# Patient Record
Sex: MALE | Race: WHITE | Employment: PART TIME | URBAN - METROPOLITAN AREA
[De-identification: names, ages, dates, MRNs, and addresses within clinical notes are randomized per-mention and may not be internally consistent; named-entity substitution may affect disease eponyms.]

---

## 2017-01-10 ENCOUNTER — OFFICE VISIT (OUTPATIENT)
Dept: DERMATOLOGY | Facility: CLINIC | Age: 72
End: 2017-01-10
Payer: COMMERCIAL

## 2017-01-10 DIAGNOSIS — L30.1 DYSHIDROTIC ECZEMA: Primary | ICD-10-CM

## 2017-01-10 PROCEDURE — 96910 PHOTCHMTX TAR&UVB/PTRLTM&UVB: CPT | Performed by: PHYSICIAN ASSISTANT

## 2017-01-10 NOTE — PROGRESS NOTES
Pt here today for NBUVB for dyshidrotic eczema - doing great. Going to do once per week for this week and next  -Treatment # 71 - 9630  mj  - 1min 50 seconds   -Zinc oxide applied to lips, nipples   -Mineral oil to affected areas

## 2017-01-17 ENCOUNTER — OFFICE VISIT (OUTPATIENT)
Dept: DERMATOLOGY | Facility: CLINIC | Age: 72
End: 2017-01-17
Payer: COMMERCIAL

## 2017-01-17 DIAGNOSIS — L30.1 DYSHIDROTIC ECZEMA: Primary | ICD-10-CM

## 2017-01-17 PROCEDURE — 96910 PHOTCHMTX TAR&UVB/PTRLTM&UVB: CPT | Performed by: PHYSICIAN ASSISTANT

## 2017-01-19 NOTE — PROGRESS NOTES
Pt here today for NBUVB for dyshidrotic eczema - doing great - will stop after today's treatment  -Treatment # 16 - 2191  mj  - 1min 50 seconds   -Zinc oxide applied to lips, nipples   -Mineral oil to affected areas

## 2017-03-02 DIAGNOSIS — I10 ESSENTIAL HYPERTENSION, BENIGN: ICD-10-CM

## 2017-03-02 RX ORDER — LISINOPRIL AND HYDROCHLOROTHIAZIDE 20; 25 MG/1; MG/1
1 TABLET ORAL DAILY
Qty: 30 TABLET | Refills: 0 | Status: SHIPPED | OUTPATIENT
Start: 2017-03-02 | End: 2017-03-16

## 2017-03-02 NOTE — TELEPHONE ENCOUNTER
Lisinopril/HCTZ 20-25mg      Last Written Prescription Date: 2/25/2016  Last Fill Quantity: 90, # refills: 3  Last Office Visit with [facility] or [facility] prescribing provider: 2/25/2016  Next 5 appointments (look out 90 days)     Mar 31, 2017 10:15 AM CDT   Return Visit with Mani Rodríguez MD   [facility] ([facility])    [address] [place] [address] [id number] [phone]                   Potassium   Date Value Ref Range Status   02/18/2016 4.0 3.4 - 5.3 mmol/L Final     Creatinine   Date Value Ref Range Status   02/18/2016 1.01 0.66 - 1.25 mg/dL Final     BP Readings from Last 3 Encounters:   10/11/16 (!) 168/92   03/09/16 138/90   02/25/16 122/76

## 2017-03-02 NOTE — TELEPHONE ENCOUNTER
Reason for call: Medication   If this is a refill request, has the caller requested the refill from the pharmacy already? Yes  Will the patient be using a [facility]? No  Name of the pharmacy and phone number for the current request: [facility] in [place]    Name of the medication requested: Lisinopril    Other request: Please call patient after approved, as patient is out .    Phone Number Pt can be reached at: Home number on file [phone] (home)  Best Time: anytime  Can we leave a detailed message on this number? YES

## 2017-03-10 DIAGNOSIS — I10 ESSENTIAL HYPERTENSION, BENIGN: Primary | ICD-10-CM

## 2017-03-10 DIAGNOSIS — I42.9 IDIOPATHIC CARDIOMYOPATHY (H): ICD-10-CM

## 2017-03-10 DIAGNOSIS — E78.5 HYPERLIPIDEMIA WITH TARGET LDL LESS THAN 130: ICD-10-CM

## 2017-03-14 DIAGNOSIS — I10 ESSENTIAL HYPERTENSION, BENIGN: ICD-10-CM

## 2017-03-14 DIAGNOSIS — E78.5 HYPERLIPIDEMIA WITH TARGET LDL LESS THAN 130: ICD-10-CM

## 2017-03-14 LAB
ALT SERPL W P-5'-P-CCNC: 37 U/L (ref 0–70)
ANION GAP SERPL CALCULATED.3IONS-SCNC: 7 MMOL/L (ref 3–14)
BUN SERPL-MCNC: 22 MG/DL (ref 7–30)
CALCIUM SERPL-MCNC: 8.5 MG/DL (ref 8.5–10.1)
CHLORIDE SERPL-SCNC: 103 MMOL/L (ref 94–109)
CHOLEST SERPL-MCNC: 132 MG/DL
CO2 SERPL-SCNC: 29 MMOL/L (ref 20–32)
CREAT SERPL-MCNC: 1 MG/DL (ref 0.66–1.25)
GFR SERPL CREATININE-BSD FRML MDRD: 74 ML/MIN/1.7M2
GLUCOSE SERPL-MCNC: 101 MG/DL (ref 70–99)
HDLC SERPL-MCNC: 40 MG/DL
LDLC SERPL CALC-MCNC: 75 MG/DL
NONHDLC SERPL-MCNC: 92 MG/DL
POTASSIUM SERPL-SCNC: 4 MMOL/L (ref 3.4–5.3)
SODIUM SERPL-SCNC: 139 MMOL/L (ref 133–144)
TRIGL SERPL-MCNC: 84 MG/DL

## 2017-03-14 PROCEDURE — 80061 LIPID PANEL: CPT | Performed by: INTERNAL MEDICINE

## 2017-03-14 PROCEDURE — 80048 BASIC METABOLIC PNL TOTAL CA: CPT | Performed by: INTERNAL MEDICINE

## 2017-03-14 PROCEDURE — 36415 COLL VENOUS BLD VENIPUNCTURE: CPT | Performed by: INTERNAL MEDICINE

## 2017-03-14 PROCEDURE — 84460 ALANINE AMINO (ALT) (SGPT): CPT | Performed by: INTERNAL MEDICINE

## 2017-03-16 ENCOUNTER — OFFICE VISIT (OUTPATIENT)
Dept: INTERNAL MEDICINE | Facility: CLINIC | Age: 72
End: 2017-03-16
Payer: COMMERCIAL

## 2017-03-16 VITALS
HEART RATE: 69 BPM | BODY MASS INDEX: 35.66 KG/M2 | OXYGEN SATURATION: 95 % | HEIGHT: 70 IN | TEMPERATURE: 98.1 F | DIASTOLIC BLOOD PRESSURE: 86 MMHG | SYSTOLIC BLOOD PRESSURE: 146 MMHG | WEIGHT: 249.1 LBS

## 2017-03-16 DIAGNOSIS — E78.5 HYPERLIPIDEMIA WITH TARGET LDL LESS THAN 130: ICD-10-CM

## 2017-03-16 DIAGNOSIS — Z11.59 NEED FOR HEPATITIS C SCREENING TEST: ICD-10-CM

## 2017-03-16 DIAGNOSIS — I42.9 IDIOPATHIC CARDIOMYOPATHY (H): ICD-10-CM

## 2017-03-16 DIAGNOSIS — Z00.00 MEDICARE ANNUAL WELLNESS VISIT, SUBSEQUENT: Primary | ICD-10-CM

## 2017-03-16 DIAGNOSIS — Z13.6 SCREENING FOR AAA (ABDOMINAL AORTIC ANEURYSM): ICD-10-CM

## 2017-03-16 DIAGNOSIS — I10 ESSENTIAL HYPERTENSION, BENIGN: ICD-10-CM

## 2017-03-16 PROCEDURE — 99397 PER PM REEVAL EST PAT 65+ YR: CPT | Performed by: INTERNAL MEDICINE

## 2017-03-16 PROCEDURE — 99213 OFFICE O/P EST LOW 20 MIN: CPT | Mod: 25 | Performed by: INTERNAL MEDICINE

## 2017-03-16 RX ORDER — CARVEDILOL 25 MG/1
25 TABLET ORAL 2 TIMES DAILY WITH MEALS
Qty: 180 TABLET | Refills: 1 | Status: SHIPPED | OUTPATIENT
Start: 2017-03-16 | End: 2017-03-31

## 2017-03-16 RX ORDER — SPIRONOLACTONE 25 MG/1
25 TABLET ORAL DAILY
Qty: 90 TABLET | Refills: 0 | Status: SHIPPED | OUTPATIENT
Start: 2017-03-16 | End: 2017-06-15

## 2017-03-16 RX ORDER — LISINOPRIL AND HYDROCHLOROTHIAZIDE 20; 25 MG/1; MG/1
1 TABLET ORAL DAILY
Qty: 90 TABLET | Refills: 1 | Status: SHIPPED | OUTPATIENT
Start: 2017-03-16 | End: 2017-10-01

## 2017-03-16 RX ORDER — SIMVASTATIN 40 MG
40 TABLET ORAL AT BEDTIME
Qty: 90 TABLET | Refills: 3 | Status: SHIPPED | OUTPATIENT
Start: 2017-03-16 | End: 2018-04-06

## 2017-03-16 NOTE — NURSING NOTE
"Chief Complaint   Patient presents with     Physical       Initial /84  Pulse 69  Temp 98.1  F (36.7  C) (Oral)  Ht 5' 10\" (1.778 m)  Wt 249 lb 1.6 oz (113 kg)  SpO2 95%  BMI 35.74 kg/m2 Estimated body mass index is 35.74 kg/(m^2) as calculated from the following:    Height as of this encounter: 5' 10\" (1.778 m).    Weight as of this encounter: 249 lb 1.6 oz (113 kg).  Medication Reconciliation: complete   ROSENDO Enciso      "

## 2017-03-16 NOTE — MR AVS SNAPSHOT
After Visit Summary   3/16/2017    Ej George    MRN: [id number]           Patient Information     Date Of Birth          1945        Visit Information        Provider Department      3/16/2017 3:20 PM Abiel Negron MD [facility]        Today's Diagnoses     Medicare annual wellness visit, subsequent    -  1    Hyperlipidemia with target LDL less than 130        Idiopathic cardiomyopathy (H)        BENIGN HYPERTENSION        Need for hepatitis C screening test        Screening for AAA (abdominal aortic aneurysm)          Care Instructions      Preventive Health Recommendations:       Male Ages 65 and over    Yearly exam:             See your health care provider every year in order to  o   Review health changes.   o   Discuss preventive care.    o   Review your medicines if your doctor has prescribed any.    Talk with your health care provider about whether you should have a test to screen for prostate cancer (PSA).    Every 3 years, have a diabetes test (fasting glucose). If you are at risk for diabetes, you should have this test more often.    Every 5 years, have a cholesterol test. Have this test more often if you are at risk for high cholesterol or heart disease.     Every 10 years, have a colonoscopy. Or, have a yearly FIT test (stool test). These exams will check for colon cancer.    Talk to with your health care provider about screening for Abdominal Aortic Aneurysm if you have a family history of AAA or have a history of smoking.  Shots:     Get a flu shot each year.     Get a tetanus shot every 10 years.     Talk to your doctor about your pneumonia vaccines. There are now two you should receive - Pneumovax (PPSV 23) and Prevnar (PCV 13).    Talk to your doctor about a shingles vaccine.     Talk to your doctor about the hepatitis B vaccine.  Nutrition:     Eat at least 5 servings of fruits and vegetables each day.     Eat whole-grain bread, whole-wheat  pasta and brown rice instead of white grains and rice.     Talk to your doctor about Calcium and Vitamin D.   Lifestyle    Exercise for at least 150 minutes a week (30 minutes a day, 5 days a week). This will help you control your weight and prevent disease.     Limit alcohol to one drink per day.     No smoking.     Wear sunscreen to prevent skin cancer.     See your dentist every six months for an exam and cleaning.     See your eye doctor every 1 to 2 years to screen for conditions such as glaucoma, macular degeneration and cataracts.        Follow-ups after your visit        Your next 10 appointments already scheduled     Mar 21, 2017  3:00 PM CDT   Ech Complete with [id number]   [facility] CV Echocardiography ([facility] at [facility])    [address]  [address]  [facility] [id number] [phone]           1.  Please bring or wear a comfortable two-piece outfit. 2.  You may eat, drink and take your normal medicines. 3.  For any questions that cannot be answered, please contact the ordering physician            Mar 31, 2017 10:15 AM CDT   Return Visit with Mani Rodríguez MD   [facility] [place] ([facility])    [address]  [facility] [id number] [phone]              Future tests that were ordered for you today     Open Future Orders        Priority Expected Expires Ordered    Basic metabolic panel Routine 3/30/2017 3/16/2018 3/16/2017    Hepatitis C Screen Reflex to HCV RNA Quant and Genotype Routine  9/12/2017 3/16/2017            Who to contact     If you have questions or need follow up information about today's clinic visit or your schedule please contact [facility] directly at [phone].  Normal or non-critical lab and imaging results will be communicated to you by MyChart, letter or phone within 4 business days after the clinic has received the results. If you do not  "hear from us within 7 days, please contact the clinic through General Specific or phone. If you have a critical or abnormal lab result, we will notify you by phone as soon as possible.  Submit refill requests through General Specific or call your pharmacy and they will forward the refill request to us. Please allow 3 business days for your refill to be completed.          Additional Information About Your Visit        [employer] Information     General Specific gives you secure access to your electronic health record. If you see a primary care provider, you can also send messages to your care team and make appointments. If you have questions, please call your primary care clinic.  If you do not have a primary care provider, please call [phone] and they will assist you.        Care EveryWhere ID     This is your Care EveryWhere ID. This could be used by other organizations to access your [facility] medical records  [id number]        Your Vitals Were     Pulse Temperature Height Pulse Oximetry BMI (Body Mass Index)       69 98.1  F (36.7  C) (Oral) 5' 10\" (1.778 m) 95% 35.74 kg/m2        Blood Pressure from Last 3 Encounters:   03/16/17 146/86   10/11/16 (!) 168/92   03/09/16 138/90    Weight from Last 3 Encounters:   03/16/17 249 lb 1.6 oz (113 kg)   10/11/16 240 lb (108.9 kg)   03/09/16 247 lb (112 kg)              We Performed the Following     [facility] NOTIFICATION          Today's Medication Changes          These changes are accurate as of: 3/16/17  4:00 PM.  If you have any questions, ask your nurse or doctor.               Start taking these medicines.        Dose/Directions    spironolactone 25 MG tablet   Commonly known as:  ALDACTONE   Used for:  Idiopathic cardiomyopathy (H), Essential hypertension, benign   Started by:  Abiel Negron MD        Dose:  25 mg   Take 1 tablet (25 mg) by mouth daily   Quantity:  90 tablet   Refills:  0         Stop taking these medicines if you haven't already. Please contact your care team " if you have questions.     augmented betamethasone dipropionate 0.05 % ointment   Commonly known as:  DIPROLENE-AF   Stopped by:  Abiel Negron MD           fluocinonide 0.05 % ointment   Commonly known as:  LIDEX   Stopped by:  Abiel Negron MD           mometasone 0.1 % ointment   Commonly known as:  ELOCON   Stopped by:  Abiel Negron MD           predniSONE 10 MG tablet   Commonly known as:  DELTASONE   Stopped by:  Abiel Negron MD                Where to get your medicines      These medications were sent to [facility] [place] - [address]  [address] [place] [address]     Phone:  [phone]     carvedilol 25 MG tablet    lisinopril-hydrochlorothiazide 20-25 MG per tablet    simvastatin 40 MG tablet    spironolactone 25 MG tablet                Primary Care Provider Office Phone # Fax #    Abiel Negron [phone]       [facility] [address] [place] [phone]        Thank you!     Thank you for choosing [facility]  for your care. Our goal is always to provide you with excellent care. Hearing back from our patients is one way we can continue to improve our services. Please take a few minutes to complete the written survey that you may receive in the mail after your visit with us. Thank you!             Your Updated Medication List - Protect others around you: Learn how to safely use, store and throw away your medicines at www.disposemymeds.org.          This list is accurate as of: 3/16/17  4:00 PM.  Always use your most recent med list.                   Brand Name Dispense Instructions for use    aspirin 81 MG tablet     0    take one tablet daily with food       carvedilol 25 MG tablet    COREG    180 tablet    Take 1 tablet (25 mg) by mouth 2 times daily (with meals)       glucosamine 500 MG Caps     60    1 tablet twice daily       lisinopril-hydrochlorothiazide  20-25 MG per tablet    PRINZIDE/ZESTORETIC    90 tablet    Take 1 tablet by mouth daily       omeprazole 20 MG CR capsule    priLOSEC    93 Cap    take 30'-60' before 1st meal daily       order for DME     4    Jobst stockings       simvastatin 40 MG tablet    ZOCOR    90 tablet    Take 1 tablet (40 mg) by mouth At Bedtime       spironolactone 25 MG tablet    ALDACTONE    90 tablet    Take 1 tablet (25 mg) by mouth daily       triamcinolone 0.1 % ointment    KENALOG    454 g    Apply to AA BID x 1-2 weeks then PRN       RITO-MIN TABS   OR     30    1 TABLET DAILY

## 2017-03-16 NOTE — PROGRESS NOTES
SUBJECTIVE:                                                            Ej George is a 71 year old male who presents for Preventive Visit.    Are you in the first 12 months of your Medicare coverage?  No    Physical   Annual:     Getting at least 3 servings of Calcium per day::  Yes    Bi-annual eye exam::  Yes    Dental care twice a year::  Yes    Sleep apnea or symptoms of sleep apnea::  None    Diet::  Regular (no restrictions) and Low salt    Frequency of exercise::  1 day/week    Duration of exercise::  15-30 minutes    Taking medications regularly::  Yes    Medication side effects::  None    Additional concerns today::  No      COGNITIVE SCREEN  1) Repeat 3 items (Banana, Sunrise, Chair)    2) Clock draw: NORMAL  3) 3 item recall: Recalls 1 object   Results: NORMAL clock, 1-2 items recalled: COGNITIVE IMPAIRMENT LESS LIKELY    Mini-CogTM Copyright S Kirby. Licensed by the author for use in [facility]; reprinted with permission ([email or web address]). All rights reserved.        Reviewed and updated as needed this visit by clinical staff  Tobacco  Allergies         Reviewed and updated as needed this visit by Provider        Social History   Substance Use Topics     Smoking status: Former Smoker     Packs/day: 0.50     Years: 14.00     Types: Cigarettes     Start date: 1/1/1961     Quit date: 1/1/1975     Smokeless tobacco: Never Used     Alcohol use 0.6 oz/week     1 Standard drinks or equivalent per week      Comment: occasionally       The patient does not drink >3 drinks per day nor >7 drinks per week.    Today's PHQ-2 Score:   PHQ-2 ( 1999 Pfizer) 3/15/2017   Little interest or pleasure in doing things Not at all   Feeling down, depressed or hopeless Not at all   PHQ-2 Score 0       Do you feel safe in your environment - Yes    Do you have a Health Care Directive?: Yes: Advance Directive has been received and scanned.    Current providers sharing in care for this patient include:   Patient  "Care Team:  Abiel Negron MD as PCP - General      Hearing impairment: Yes, Difficulty following a conversation in a noisy restaurant or crowded room.    Ability to successfully perform activities of daily living: Yes, no assistance needed     Fall risk:       Home safety:  none identified    The following health maintenance items are reviewed in Epic and correct as of today:  Health Maintenance   Topic Date Due     HEPATITIS C SCREENING  09/17/1963     AORTIC ANEURYSM SCREENING (SYSTEM ASSIGNED)  09/17/2010     ADVANCE DIRECTIVE PLANNING Q5 YRS (NO INBASKET)  05/19/2016     FALL RISK ASSESSMENT  02/25/2017     INFLUENZA VACCINE (SYSTEM ASSIGNED)  09/01/2017     LIPID MONITORING Q1 YEAR( NO INBASKET)  03/14/2018     COLONOSCOPY Q3 YR INBASKET MESSAGE  04/27/2018     TETANUS Q10 YR  07/02/2018     PNEUMOCOCCAL  Completed        ROS:  Constitutional, HEENT, cardiovascular, pulmonary, GI, , musculoskeletal, neuro, skin, endocrine and psych systems are negative, except as otherwise noted.     Problem list, Medication list, Allergies, and Medical/Social/Surgical histories reviewed in [facility] and updated as appropriate.  OBJECTIVE:                                                            /84  Pulse 69  Temp 98.1  F (36.7  C) (Oral)  Ht 5' 10\" (1.778 m)  Wt 249 lb 1.6 oz (113 kg)  SpO2 95%  BMI 35.74 kg/m2 Estimated body mass index is 35.74 kg/(m^2) as calculated from the following:    Height as of this encounter: 5' 10\" (1.778 m).    Weight as of this encounter: 249 lb 1.6 oz (113 kg).  EXAM:   GENERAL: alert, no distress and over weight  EYES: Eyes grossly normal to inspection, PERRL and conjunctivae and sclerae normal  HENT: ear canals and TM's normal, nose and mouth without ulcers or lesions  NECK: no adenopathy, no asymmetry, masses, or scars and thyroid normal to palpation  RESP: lungs clear to auscultation - no rales, rhonchi or wheezes  CV: regular rate and rhythm, normal S1 S2, no S3 or S4, no murmur, " "click or rub, no peripheral edema and peripheral pulses strong  ABDOMEN: soft, nontender, no hepatosplenomegaly, no masses and bowel sounds normal  MS: no gross musculoskeletal defects noted, no edema  SKIN: no suspicious lesions or rashes  NEURO: Normal strength and tone, mentation intact and speech normal  PSYCH: mentation appears normal, affect normal/bright  LYMPH: no cervical, supraclavicular, axillary, or inguinal adenopathy       ASSESSMENT / PLAN:                                                            1. Medicare annual wellness visit, subsequent  uptodate on Ca screening     2. Hyperlipidemia with target LDL less than 130  - simvastatin (ZOCOR) 40 MG tablet; Take 1 tablet (40 mg) by mouth At Bedtime  Dispense: 90 tablet; Refill: 3    3. Idiopathic cardiomyopathy (H)  - carvedilol (COREG) 25 MG tablet; Take 1 tablet (25 mg) by mouth 2 times daily (with meals)  Dispense: 180 tablet; Refill: 1  - spironolactone (ALDACTONE) 25 MG tablet; Take 1 tablet (25 mg) by mouth daily  Dispense: 90 tablet; Refill: 0    4. BENIGN HYPERTENSION  Add spironolcatone. Labs 1-2 wks  - carvedilol (COREG) 25 MG tablet; Take 1 tablet (25 mg) by mouth 2 times daily (with meals)  Dispense: 180 tablet; Refill: 1  - lisinopril-hydrochlorothiazide (PRINZIDE/ZESTORETIC) 20-25 MG per tablet; Take 1 tablet by mouth daily  Dispense: 90 tablet; Refill: 1  - spironolactone (ALDACTONE) 25 MG tablet; Take 1 tablet (25 mg) by mouth daily  Dispense: 90 tablet; Refill: 0  - Basic metabolic panel; Future    5. Need for hepatitis C screening test  - Hepatitis C Screen Reflex to HCV RNA Quant and Genotype; Future    End of Life Planning:  Patient currently has an advanced directive: Yes.  Practitioner is supportive of decision.    COUNSELING:  Reviewed preventive health counseling, as reflected in patient instructions      Estimated body mass index is 35.74 kg/(m^2) as calculated from the following:    Height as of this encounter: 5' 10\" (1.778 " m).    Weight as of this encounter: 249 lb 1.6 oz (113 kg).  Weight management plan: Discussed healthy diet and exercise guidelines and patient will follow up in 6 months in clinic to re-evaluate.   reports that he quit smoking about 42 years ago. His smoking use included Cigarettes. He started smoking about 56 years ago. He has a 7.00 pack-year smoking history. He has never used smokeless tobacco.      Appropriate preventive services were discussed with this patient, including applicable screening as appropriate for cardiovascular disease, diabetes, osteopenia/osteoporosis, and glaucoma.  As appropriate for age/gender, discussed screening for colorectal cancer, prostate cancer, breast cancer, and cervical cancer. Checklist reviewing preventive services available has been given to the patient.    Reviewed patients plan of care and provided an AVS. The Basic Care Plan (routine screening as documented in Health Maintenance) for Ej meets the Care Plan requirement. This Care Plan has been established and reviewed with the Patient.    Counseling Resources:  ATP IV Guidelines  Pooled Cohorts Equation Calculator  Breast Cancer Risk Calculator  FRAX Risk Assessment  ICSI Preventive Guidelines  Dietary Guidelines for Americans, 2010  USDA's MyPlate  ASA Prophylaxis  Lung CA Screening    Abiel Negron MD  [facility]  Answers for HPI/ROS submitted by the patient on 3/15/2017   Q1: Little interest or pleasure in doing things: 0=Not at all  Q2: Feeling down, depressed or hopeless: 0=Not at all  PHQ-2 Score: 0

## 2017-03-17 ENCOUNTER — DOCUMENTATION ONLY (OUTPATIENT)
Dept: VASCULAR SURGERY | Facility: CLINIC | Age: 72
End: 2017-03-17

## 2017-03-21 ENCOUNTER — HOSPITAL ENCOUNTER (OUTPATIENT)
Dept: CARDIOLOGY | Facility: CLINIC | Age: 72
Discharge: HOME | End: 2017-03-21
Attending: INTERNAL MEDICINE | Admitting: INTERNAL MEDICINE
Payer: MEDICARE

## 2017-03-21 DIAGNOSIS — I10 ESSENTIAL HYPERTENSION, BENIGN: ICD-10-CM

## 2017-03-21 DIAGNOSIS — E78.5 HYPERLIPIDEMIA WITH TARGET LDL LESS THAN 130: ICD-10-CM

## 2017-03-21 DIAGNOSIS — I42.9 IDIOPATHIC CARDIOMYOPATHY (H): ICD-10-CM

## 2017-03-21 PROCEDURE — 93306 TTE W/DOPPLER COMPLETE: CPT | Mod: 26 | Performed by: INTERNAL MEDICINE

## 2017-03-21 PROCEDURE — 93306 TTE W/DOPPLER COMPLETE: CPT

## 2017-03-31 ENCOUNTER — OFFICE VISIT (OUTPATIENT)
Dept: CARDIOLOGY | Facility: CLINIC | Age: 72
End: 2017-03-31
Attending: INTERNAL MEDICINE
Payer: COMMERCIAL

## 2017-03-31 VITALS
DIASTOLIC BLOOD PRESSURE: 70 MMHG | BODY MASS INDEX: 34.93 KG/M2 | WEIGHT: 244 LBS | SYSTOLIC BLOOD PRESSURE: 120 MMHG | HEART RATE: 70 BPM | HEIGHT: 70 IN

## 2017-03-31 DIAGNOSIS — E78.5 HYPERLIPIDEMIA WITH TARGET LDL LESS THAN 130: ICD-10-CM

## 2017-03-31 DIAGNOSIS — I10 ESSENTIAL HYPERTENSION, BENIGN: ICD-10-CM

## 2017-03-31 DIAGNOSIS — I42.9 IDIOPATHIC CARDIOMYOPATHY (H): ICD-10-CM

## 2017-03-31 PROCEDURE — 99214 OFFICE O/P EST MOD 30 MIN: CPT | Performed by: INTERNAL MEDICINE

## 2017-03-31 RX ORDER — CARVEDILOL 25 MG/1
25 TABLET ORAL 2 TIMES DAILY WITH MEALS
Qty: 180 TABLET | Refills: 1 | Status: SHIPPED | OUTPATIENT
Start: 2017-03-31 | End: 2018-04-06

## 2017-03-31 NOTE — LETTER
3/31/2017    Abiel Negron MD  [facility]   [address] [facility] [address]    RE: Ej George       Dear Colleague,    I had the pleasure of seeing Ej George in the [facility].    I saw Gregory George today.  Gregory has had a chronic mild to moderate to nonischemic cardiomyopathy with ejection fractions in the 40%-45% range.  He generally takes meticulous care of himself.  He watches what he eats, and his weight has been relatively stable.  He has a history of hypertension that has been well-controlled and he relates that he recently added spironolactone to his program because of some mild hypertension.   VITAL SIGNS:  His blood pressure is 120/70, heart rate 70, he weighed 244 pounds.   NECK:  He had no neck vein distention or bruits.   HEART:  Regular without gallop or murmur.   LUNGS:  Clear.   ABDOMEN:  Soft, firm, obese, nontender.   EXTREMITIES:  Free of edema.      He has been on chronic simvastatin 40 mg at bedtime.  With that, his LDL 10 days ago was 75, total cholesterol 132, triglycerides 85.  Creatinine 1.0, BUN 22.        His current program is:   1.  Carvedilol 25 mg b.i.d.   2.  Lisinopril/hydrochlorothiazide 20/25 mg a day.   3.  Spironolactone 25 mg daily.   4.  Aspirin 81 mg a day.   5.  Omeprazole 20 mg a day.     6.  He is also taking some p.r.n. vitamins, glucosamine, etc.      He had a surveillance echo dated 03/21/2017.  This showed a nondilated left ventricle.  The ejection fraction was called normal at 55%-60%.  There was some diastolic dysfunction of the LV but that is no surprise.  The right ventricle was mildly dilated but systolic function was normal.  The left atrium was likely normal size; the right atrium appeared mildly dilated.  A PFO was suspected.  There was no significant mitral valve disease.  The aortic valve was trileaflet.  The tricuspid valve showed mild tricuspid insufficiency with RVSP pressures at 25  mmHg plus right atrial pressure.      Gregory says that he is living with his wife.  They are in a house.  He has an acre to take of and a 4 acre lot.  He has a riding lawnmower.  He tends to be more active as most of us are in the spring, summer and fall and he is looking forward to the next growing season.  He still works part-time.  He seems to be a happy miesha and I thought he was doing marvelously.     Outpatient Encounter Prescriptions as of 3/31/2017   Medication Sig Dispense Refill     carvedilol (COREG) 25 MG tablet Take 1 tablet (25 mg) by mouth 2 times daily (with meals) 180 tablet 1     simvastatin (ZOCOR) 40 MG tablet Take 1 tablet (40 mg) by mouth At Bedtime 90 tablet 3     lisinopril-hydrochlorothiazide (PRINZIDE/ZESTORETIC) 20-25 MG per tablet Take 1 tablet by mouth daily 90 tablet 1     spironolactone (ALDACTONE) 25 MG tablet Take 1 tablet (25 mg) by mouth daily 90 tablet 0     [DISCONTINUED] carvedilol (COREG) 25 MG tablet Take 1 tablet (25 mg) by mouth 2 times daily (with meals) 180 tablet 1     triamcinolone (KENALOG) 0.1 % ointment Apply to AA BID x 1-2 weeks then  g 3     OMEPRAZOLE 20 MG PO CPDR take 30'-60' before 1st meal daily 93 Cap 3     ORDER FOR DME Jobst stockings 4 5     ASPIRIN 81 MG OR TABS take one tablet daily with food 0 0     GLUCOSAMINE 500 MG OR CAPS 1 tablet twice daily 60 5     RITO-MIN TABS   OR 1 TABLET DAILY 30 5     No facility-administered encounter medications on file as of 3/31/2017.       IMPRESSION:     1.  Improved LV systolic function by echo.      2.  No symptoms of left heart failure.     3.  Treated hypertension, better on spironolactone.    4.  Treated hyperlipidemia.     5.  Achy knees are his primary thing that limits him these days.  We discussed the use of Naprosyn.  Generally, I advise people on the possible side effects of Naprosyn, but in Gregory's case, he has taken it and it has never bothered him.  He has normal renal function.  He has never had  gastrointestinal or bleeding issues so I told him that he could understand the risks and benefits and make his own choice.      We did discuss diet, weight loss and exercise and he plans to be more active with summer approaching.      He has done marvelously.  He really looks good.  I made no changes.  I did not order any tests and did not order any studies but I did ask to see him in a year.          Over 35 minutes was spent reviewing old records, imaging, x-rays, lab results, lipid profile and echo results serially.       Again, thank you for allowing me to participate in the care of your patient.      Sincerely,    TEN COLBRET MD     [facility]

## 2017-03-31 NOTE — PROGRESS NOTES
HPI and Plan:   See dictation    I recommend continuing current treatment plans in the chart.      No orders of the defined types were placed in this encounter.    No orders of the defined types were placed in this encounter.    There are no discontinued medications.      Encounter Diagnoses   Name Primary?     Idiopathic cardiomyopathy (H)      Hyperlipidemia with target LDL less than 130      BENIGN HYPERTENSION        CURRENT MEDICATIONS:  Current Outpatient Prescriptions   Medication Sig Dispense Refill     simvastatin (ZOCOR) 40 MG tablet Take 1 tablet (40 mg) by mouth At Bedtime 90 tablet 3     carvedilol (COREG) 25 MG tablet Take 1 tablet (25 mg) by mouth 2 times daily (with meals) 180 tablet 1     lisinopril-hydrochlorothiazide (PRINZIDE/ZESTORETIC) 20-25 MG per tablet Take 1 tablet by mouth daily 90 tablet 1     spironolactone (ALDACTONE) 25 MG tablet Take 1 tablet (25 mg) by mouth daily 90 tablet 0     triamcinolone (KENALOG) 0.1 % ointment Apply to AA BID x 1-2 weeks then  g 3     OMEPRAZOLE 20 MG PO CPDR take 30'-60' before 1st meal daily 93 Cap 3     ORDER FOR DME Jobst stockings 4 5     ASPIRIN 81 MG OR TABS take one tablet daily with food 0 0     GLUCOSAMINE 500 MG OR CAPS 1 tablet twice daily 60 5     RITO-MIN TABS   OR 1 TABLET DAILY 30 5       ALLERGIES     Allergies   Allergen Reactions     No Known Allergies        PAST MEDICAL HISTORY:  Past Medical History:   Diagnosis Date     CHF (congestive heart failure) (H)     EF 40-45%. now 55%     Eczema      Esophageal reflux      Essential hypertension, benign      Hyperlipidemia LDL goal < 130      Idiopathic cardiomyopathy (H)      Ulcerative (chronic) proctitis (H)      Varicose veins of leg        PAST SURGICAL HISTORY:  Past Surgical History:   Procedure Laterality Date     ENDOSCOPIC STRIPPING VEIN(S)       HC REPAIR ROTATOR CUFF,ACUTE  8/10/05    right       FAMILY HISTORY:  Family History   Problem Relation Age of Onset     HEART  "DISEASE Mother 62     d:age 62 MI     HEART DISEASE Father 86     D:86 chf     CANCER Brother      b:1935 hogdkins in remission     DIABETES Brother      b:1950     CANCER Sister      d:age 58 liver     Family History Negative Sister      b:1934     Hypertension Sister      b:1942     CEREBROVASCULAR DISEASE Brother           Prostate Cancer Brother 65     CANCER Son 35     brain cancer - surgery with seizures     C.A.D. Son 36     tobacco       SOCIAL HISTORY:  Social History     Social History     Marital status:      Spouse name: Elizabeth     Number of children: 4     Years of education: 14     Occupational History     tool       [employer]     Social History Main Topics     Smoking status: Former Smoker     Packs/day: 0.50     Years: 14.00     Types: Cigarettes     Start date: 1/1/1961     Quit date: 1/1/1975     Smokeless tobacco: Never Used     Alcohol use 0.6 oz/week     1 Standard drinks or equivalent per week      Comment: occasionally     Drug use: No     Sexual activity: Yes     Partners: Female     Other Topics Concern     Caffeine Concern Yes     4-5 cups daily     Sleep Concern No     Stress Concern No     Weight Concern Yes     watches it     Special Diet Yes     low sodium     Exercise Yes     walking at work     Social History Narrative         Review of Systems:  Skin:  not assessed       Eyes:  Positive for glasses    ENT:  Negative      Respiratory:  Positive for dyspnea on exertion     Cardiovascular:  Negative      Gastroenterology: Negative      Genitourinary:  Negative      Musculoskeletal:  Positive for joint pain both knees  Neurologic:  Negative      Psychiatric:  Negative      Heme/Lymph/Imm:  Negative      Endocrine:  Negative        Physical Exam:  Vitals: /70  Pulse 70  Ht 1.778 m (5' 10\")  Wt 110.7 kg (244 lb)  BMI 35.01 kg/m2    Constitutional:           Skin:           Head:           Eyes:           ENT:           Neck:           Chest:         "     Cardiac:                    Abdomen:           Vascular:                                          Extremities and Back:                 Neurological:             Recent Lab Results:  LIPID RESULTS:  Lab Results   Component Value Date    CHOL 132 03/14/2017    HDL 40 03/14/2017    LDL 75 03/14/2017    TRIG 84 03/14/2017    CHOLHDLRATIO 2.6 02/13/2015       LIVER ENZYME RESULTS:  Lab Results   Component Value Date    AST 38 05/10/2011    ALT 37 03/14/2017       CBC RESULTS:  Lab Results   Component Value Date    WBC 8.4 05/21/2013    RBC 5.45 05/21/2013    HGB 15.8 05/21/2013    HCT 46.7 05/21/2013    MCV 86 05/21/2013    MCH 29.0 05/21/2013    MCHC 33.8 05/21/2013    RDW 13.5 05/21/2013     05/21/2013       BMP RESULTS:  Lab Results   Component Value Date     03/14/2017    POTASSIUM 4.0 03/14/2017    CHLORIDE 103 03/14/2017    CO2 29 03/14/2017    ANIONGAP 7 03/14/2017     (H) 03/14/2017    BUN 22 03/14/2017    CR 1.00 03/14/2017    GFRESTIMATED 74 03/14/2017    GFRESTBLACK 89 03/14/2017    SAMIR 8.5 03/14/2017        A1C RESULTS:  No results found for: A1C    INR RESULTS:  No results found for: INR        CC  Mani Rodríguez MD   [facility]  [address]  [place] [address]

## 2017-03-31 NOTE — MR AVS SNAPSHOT
After Visit Summary   3/31/2017    Ej George    MRN: [id number]           Patient Information     Date Of Birth          1945        Visit Information        Provider Department      3/31/2017 10:15 AM Mani Rodríguez MD [facility]        Today's Diagnoses     Idiopathic cardiomyopathy (H)        Hyperlipidemia with target LDL less than 130        BENIGN HYPERTENSION           Follow-ups after your visit        Additional Services     Follow-Up with Cardiologist                 Future tests that were ordered for you today     Open Future Orders        Priority Expected Expires Ordered    Follow-Up with Cardiologist Routine 3/31/2018 8/13/2018 3/31/2017            Who to contact     If you have questions or need follow up information about today's clinic visit or your schedule please contact [facility] directly at [phone].  Normal or non-critical lab and imaging results will be communicated to you by CallYourPricehart, letter or phone within 4 business days after the clinic has received the results. If you do not hear from us within 7 days, please contact the clinic through CallYourPricehart or phone. If you have a critical or abnormal lab result, we will notify you by phone as soon as possible.  Submit refill requests through [employer] Efficient or call your pharmacy and they will forward the refill request to us. Please allow 3 business days for your refill to be completed.          Additional Information About Your Visit        MyChart Information     [employer] gives you secure access to your electronic health record. If you see a primary care provider, you can also send messages to your care team and make appointments. If you have questions, please call your primary care clinic.  If you do not have a primary care provider, please call [phone] and they will assist you.        Care EveryWhere ID     This is your Care EveryWhere  "ID. This could be used by other organizations to access your [place] medical records  [id number]        Your Vitals Were     Pulse Height BMI (Body Mass Index)             70 1.778 m (5' 10\") 35.01 kg/m2          Blood Pressure from Last 3 Encounters:   03/31/17 120/70   03/16/17 146/86   10/11/16 (!) 168/92    Weight from Last 3 Encounters:   03/31/17 110.7 kg (244 lb)   03/16/17 113 kg (249 lb 1.6 oz)   10/11/16 108.9 kg (240 lb)              We Performed the Following     Follow-Up with Cardiologist          Where to get your medicines      [id number] [place] - [address]  [address] [address]     Phone:  [phone]     carvedilol 25 MG tablet          Primary Care Provider Office Phone # Fax #    Abiel Negron [phone]       [facility] [address] [place] [id number]        Thank you!     Thank you for choosing [facility] [place]  for your care. Our goal is always to provide you with excellent care. Hearing back from our patients is one way we can continue to improve our services. Please take a few minutes to complete the written survey that you may receive in the mail after your visit with us. Thank you!             Your Updated Medication List - Protect others around you: Learn how to safely use, store and throw away your medicines at www.disposemymeds.org.          This list is accurate as of: 3/31/17 10:51 AM.  Always use your most recent med list.                   Brand Name Dispense Instructions for use    aspirin 81 MG tablet     0    take one tablet daily with food       carvedilol 25 MG tablet    COREG    180 tablet    Take 1 tablet (25 mg) by mouth 2 times daily (with meals)       glucosamine 500 MG Caps     60    1 tablet twice daily       lisinopril-hydrochlorothiazide 20-25 MG per tablet    PRINZIDE/ZESTORETIC    90 tablet    Take " 1 tablet by mouth daily       omeprazole 20 MG CR capsule    priLOSEC    93 Cap    take 30'-60' before 1st meal daily       order for DME     4    Jobst stockings       simvastatin 40 MG tablet    ZOCOR    90 tablet    Take 1 tablet (40 mg) by mouth At Bedtime       spironolactone 25 MG tablet    ALDACTONE    90 tablet    Take 1 tablet (25 mg) by mouth daily       triamcinolone 0.1 % ointment    KENALOG    454 g    Apply to AA BID x 1-2 weeks then PRN       RITO-MIN TABS   OR     30    1 TABLET DAILY

## 2017-04-03 NOTE — PROGRESS NOTES
HISTORY OF PRESENT ILLNESS:   I saw Gregory George today.  Gregory has had a chronic mild to moderate to nonischemic cardiomyopathy with ejection fractions in the 40%-45% range.  He generally takes meticulous care of himself.  He watches what he eats, and his weight has been relatively stable.  He has a history of hypertension that has been well-controlled and he relates that he recently added spironolactone to his program because of some mild hypertension.   VITAL SIGNS:  His blood pressure is 120/70, heart rate 70, he weighed 244 pounds.   NECK:  He had no neck vein distention or bruits.   HEART:  Regular without gallop or murmur.   LUNGS:  Clear.   ABDOMEN:  Soft, firm, obese, nontender.   EXTREMITIES:  Free of edema.      He has been on chronic simvastatin 40 mg at bedtime.  With that, his LDL 10 days ago was 75, total cholesterol 132, triglycerides 85.  Creatinine 1.0, BUN 22.        His current program is:   1.  Carvedilol 25 mg b.i.d.   2.  Lisinopril/hydrochlorothiazide 20/25 mg a day.   3.  Spironolactone 25 mg daily.   4.  Aspirin 81 mg a day.   5.  Omeprazole 20 mg a day.     6.  He is also taking some p.r.n. vitamins, glucosamine, etc.      He had a surveillance echo dated 03/21/2017.  This showed a nondilated left ventricle.  The ejection fraction was called normal at 55%-60%.  There was some diastolic dysfunction of the LV but that is no surprise.  The right ventricle was mildly dilated but systolic function was normal.  The left atrium was likely normal size; the right atrium appeared mildly dilated.  A PFO was suspected.  There was no significant mitral valve disease.  The aortic valve was trileaflet.  The tricuspid valve showed mild tricuspid insufficiency with RVSP pressures at 25 mmHg plus right atrial pressure.      Gregory says that he is living with his wife.  They are in a house.  He has an acre to take of and a 4 acre lot.  He has a riding lawnmower.  He tends to be more active as most of us are  in the spring, summer and fall and he is looking forward to the next growing season.  He still works part-time.  He seems to be a happy miesha and I thought he was doing marvelously.      IMPRESSION:     1.  Improved LV systolic function by echo.      2.  No symptoms of left heart failure.     3.  Treated hypertension, better on spironolactone.    4.  Treated hyperlipidemia.     5.  Achy knees are his primary thing that limits him these days.  We discussed the use of Naprosyn.  Generally, I advise people on the possible side effects of Naprosyn, but in Gregory's case, he has taken it and it has never bothered him.  He has normal renal function.  He has never had gastrointestinal or bleeding issues so I told him that he could understand the risks and benefits and make his own choice.      We did discuss diet, weight loss and exercise and he plans to be more active with summer approaching.      He has done marvelously.  He really looks good.  I made no changes.  I did not order any tests and did not order any studies but I did ask to see him in a year.        Over 35 minutes was spent reviewing old records, imaging, x-rays, lab results, lipid profile and echo results serially.        Ten Rodríguez MD      cc:   Abiel Negron MD   [facility]   [address] [place] [address]  [phone]         TEN RODRÍGUEZ MD, [facility]             D: 2017 12:06   T: 2017 17:13   MT: JOSEPH      Name:     SHERRELL ZAMAN   MRN:      [id number]        Account:      [id number]   :      1945           Service Date: 2017      Document: [id number]

## 2017-06-15 DIAGNOSIS — I10 ESSENTIAL HYPERTENSION, BENIGN: ICD-10-CM

## 2017-06-15 DIAGNOSIS — I42.9 IDIOPATHIC CARDIOMYOPATHY (H): ICD-10-CM

## 2017-06-15 NOTE — TELEPHONE ENCOUNTER
spironolactone (ALDACTONE) 25 MG tablet      Last Written Prescription Date: 3/16/17  Last Fill Quantity: 90, # refills: 0  Last Office Visit with G, [facility] or [facility] prescribing provider: 3/16/2017       Potassium   Date Value Ref Range Status   03/14/2017 4.0 3.4 - 5.3 mmol/L Final     Creatinine   Date Value Ref Range Status   03/14/2017 1.00 0.66 - 1.25 mg/dL Final     BP Readings from Last 3 Encounters:   03/31/17 120/70   03/16/17 146/86   10/11/16 (!) 168/92

## 2017-06-16 NOTE — TELEPHONE ENCOUNTER
sporonolactone      Last Written Prescription Date: 03/16/17  Last Fill Quantity: 90, # refills: 0  Last Office Visit with G, P or [facility] prescribing provider: 03/16/17       Potassium   Date Value Ref Range Status   03/14/2017 4.0 3.4 - 5.3 mmol/L Final     Creatinine   Date Value Ref Range Status   03/14/2017 1.00 0.66 - 1.25 mg/dL Final     BP Readings from Last 3 Encounters:   03/31/17 120/70   03/16/17 146/86   10/11/16 (!) 168/92

## 2017-06-19 RX ORDER — SPIRONOLACTONE 25 MG/1
25 TABLET ORAL DAILY
Qty: 90 TABLET | Refills: 2 | Status: SHIPPED | OUTPATIENT
Start: 2017-06-19 | End: 2018-03-11

## 2017-08-17 ENCOUNTER — OFFICE VISIT (OUTPATIENT)
Dept: INTERNAL MEDICINE | Facility: CLINIC | Age: 72
End: 2017-08-17
Payer: COMMERCIAL

## 2017-08-17 VITALS
HEART RATE: 58 BPM | BODY MASS INDEX: 35.3 KG/M2 | OXYGEN SATURATION: 96 % | SYSTOLIC BLOOD PRESSURE: 122 MMHG | WEIGHT: 246 LBS | TEMPERATURE: 97.8 F | DIASTOLIC BLOOD PRESSURE: 80 MMHG

## 2017-08-17 DIAGNOSIS — I42.9 IDIOPATHIC CARDIOMYOPATHY (H): ICD-10-CM

## 2017-08-17 DIAGNOSIS — I10 ESSENTIAL HYPERTENSION, BENIGN: ICD-10-CM

## 2017-08-17 DIAGNOSIS — R42 DIZZINESS: Primary | ICD-10-CM

## 2017-08-17 PROBLEM — E66.01 MORBID OBESITY (H): Status: ACTIVE | Noted: 2017-08-17

## 2017-08-17 LAB
ANION GAP SERPL CALCULATED.3IONS-SCNC: 2 MMOL/L (ref 3–14)
BUN SERPL-MCNC: 18 MG/DL (ref 7–30)
CALCIUM SERPL-MCNC: 9 MG/DL (ref 8.5–10.1)
CHLORIDE SERPL-SCNC: 102 MMOL/L (ref 94–109)
CO2 SERPL-SCNC: 33 MMOL/L (ref 20–32)
CREAT SERPL-MCNC: 1.13 MG/DL (ref 0.66–1.25)
ERYTHROCYTE [DISTWIDTH] IN BLOOD BY AUTOMATED COUNT: 13.4 % (ref 10–15)
GFR SERPL CREATININE-BSD FRML MDRD: 64 ML/MIN/1.7M2
GLUCOSE SERPL-MCNC: 91 MG/DL (ref 70–99)
HCT VFR BLD AUTO: 46.1 % (ref 40–53)
HGB BLD-MCNC: 14.9 G/DL (ref 13.3–17.7)
MCH RBC QN AUTO: 29 PG (ref 26.5–33)
MCHC RBC AUTO-ENTMCNC: 32.3 G/DL (ref 31.5–36.5)
MCV RBC AUTO: 90 FL (ref 78–100)
PLATELET # BLD AUTO: 160 10E9/L (ref 150–450)
POTASSIUM SERPL-SCNC: 4.6 MMOL/L (ref 3.4–5.3)
RBC # BLD AUTO: 5.14 10E12/L (ref 4.4–5.9)
SODIUM SERPL-SCNC: 137 MMOL/L (ref 133–144)
WBC # BLD AUTO: 7.4 10E9/L (ref 4–11)

## 2017-08-17 PROCEDURE — 85027 COMPLETE CBC AUTOMATED: CPT | Performed by: INTERNAL MEDICINE

## 2017-08-17 PROCEDURE — 80048 BASIC METABOLIC PNL TOTAL CA: CPT | Performed by: INTERNAL MEDICINE

## 2017-08-17 PROCEDURE — 36415 COLL VENOUS BLD VENIPUNCTURE: CPT | Performed by: INTERNAL MEDICINE

## 2017-08-17 PROCEDURE — 99213 OFFICE O/P EST LOW 20 MIN: CPT | Performed by: INTERNAL MEDICINE

## 2017-08-17 NOTE — MR AVS SNAPSHOT
After Visit Summary   8/17/2017    Ej George    MRN: [id number]           Patient Information     Date Of Birth          1945        Visit Information        Provider Department      8/17/2017 11:00 AM Abiel Negron MD [facility]        Today's Diagnoses     Dizziness    -  1    BENIGN HYPERTENSION        Idiopathic cardiomyopathy (H)           Follow-ups after your visit        Who to contact     If you have questions or need follow up information about today's clinic visit or your schedule please contact [facility] directly at [phone].  Normal or non-critical lab and imaging results will be communicated to you by JobHorecahart, letter or phone within 4 business days after the clinic has received the results. If you do not hear from us within 7 days, please contact the clinic through PrestaShopt or phone. If you have a critical or abnormal lab result, we will notify you by phone as soon as possible.  Submit refill requests through ArmorText or call your pharmacy and they will forward the refill request to us. Please allow 3 business days for your refill to be completed.          Additional Information About Your Visit        MyChart Information     ArmorText gives you secure access to your electronic health record. If you see a primary care provider, you can also send messages to your care team and make appointments. If you have questions, please call your primary care clinic.  If you do not have a primary care provider, please call [phone] and they will assist you.        Care EveryWhere ID     This is your Care EveryWhere ID. This could be used by other organizations to access your [place] medical records  [id number]        Your Vitals Were     Pulse Temperature Pulse Oximetry BMI (Body Mass Index)          58 97.8  F (36.6  C) (Oral) 96% 35.3 kg/m2         Blood Pressure from Last 3 Encounters:   08/17/17 122/80   03/31/17 120/70    03/16/17 146/86    Weight from Last 3 Encounters:   08/17/17 246 lb (111.6 kg)   03/31/17 244 lb (110.7 kg)   03/16/17 249 lb 1.6 oz (113 kg)              We Performed the Following     Basic metabolic panel     CBC with platelets        Primary Care Provider Office Phone # Fax #    Abiel Negron [phone]       [address] [facility] [address]        Equal Access to Services     ROBBIE MEDINA : Hadii aad ku hadasho Soomaali, waaxda luqadaha, qaybta kaalmada adeegyada, waxay idiin hayaan aderosalio berger . So [facility] [phone].    ATENCIÓN: Si habla español, tiene a vieira disposición servicios gratuitos de asistencia lingüística. Llame al [phone].    We comply with applicable federal civil rights laws and [place] laws. We do not discriminate on the basis of race, color, national origin, age, disability sex, sexual orientation or gender identity.            Thank you!     Thank you for choosing [facility]  for your care. Our goal is always to provide you with excellent care. Hearing back from our patients is one way we can continue to improve our services. Please take a few minutes to complete the written survey that you may receive in the mail after your visit with us. Thank you!             Your Updated Medication List - Protect others around you: Learn how to safely use, store and throw away your medicines at www.disposemymeds.org.          This list is accurate as of: 8/17/17 11:44 AM.  Always use your most recent med list.                   Brand Name Dispense Instructions for use Diagnosis    aspirin 81 MG tablet     0    take one tablet daily with food        carvedilol 25 MG tablet    COREG    180 tablet    Take 1 tablet (25 mg) by mouth 2 times daily (with meals)    Idiopathic cardiomyopathy (H), Essential hypertension, benign       glucosamine 500 MG Caps     60    1 tablet twice daily        lisinopril-hydrochlorothiazide 20-25 MG per  tablet    PRINZIDE/ZESTORETIC    90 tablet    Take 1 tablet by mouth daily    Essential hypertension, benign       omeprazole 20 MG CR capsule    priLOSEC    93 Cap    take 30'-60' before 1st meal daily    Esophageal reflux       order for DME     4    Jobst stockings    Unspecified venous (peripheral) insufficiency       simvastatin 40 MG tablet    ZOCOR    90 tablet    Take 1 tablet (40 mg) by mouth At Bedtime    Hyperlipidemia with target LDL less than 130       spironolactone 25 MG tablet    ALDACTONE    90 tablet    Take 1 tablet (25 mg) by mouth daily    Idiopathic cardiomyopathy (H), Essential hypertension, benign       triamcinolone 0.1 % ointment    KENALOG    454 g    Apply to AA BID x 1-2 weeks then PRN    Chronic dermatitis of hands       RITO-MIN TABS   OR     30    1 TABLET DAILY

## 2017-08-17 NOTE — PROGRESS NOTES
SUBJECTIVE:                                                    Ej George is a 71 year old male who presents to clinic today for the following health issues:      Dizziness  Onset: 2 months on and off in the AM    Description:   Do you feel faint:  no   Does it feel like the surroundings (bed, room) are moving: YES  Unsteady/off balance: YES  Have you passed out or fallen: no     Intensity: mild    Progression of Symptoms:  same    Accompanying Signs & Symptoms:  Heart palpitations: no   Nausea, vomiting: YES  Weakness in arms or legs: no   Fatigue: no   Vision or speech changes: no   Ringing in ears (Tinnitus): no   Hearing Loss: no     History:   Head trauma/concussion hx: no   Previous similar symptoms: YES  Recent bleeding history: no     Precipitating factors:   Worse with activity or head movement: no   Any new medications (BP?): YES  Alcohol/drug abuse/withdrawal: no     Alleviating factors:   Does staying in a fixed position give relief:  YES    Therapies Tried and outcome: none        Problem list and histories reviewed & adjusted, as indicated.  Additional history: as documented    Labs reviewed in EPIC    Reviewed and updated as needed this visit by clinical staffAllergies       Reviewed and updated as needed this visit by Provider         ROS:  Constitutional, HEENT, cardiovascular, pulmonary, gi and gu systems are negative, except as otherwise noted.      OBJECTIVE:                                                    /80  Pulse 58  Temp 97.8  F (36.6  C) (Oral)  Wt 246 lb (111.6 kg)  SpO2 96%  BMI 35.3 kg/m2  Body mass index is 35.3 kg/(m^2).  GENERAL APPEARANCE: alert and no distress  HENT: nose and mouth without ulcers or lesions  NECK: no adenopathy, no asymmetry, masses, or scars and thyroid normal to palpation  RESP: lungs clear to auscultation - no rales, rhonchi or wheezes  CV: regular rates and rhythm, normal S1 S2, no S3 or S4 and no murmur, click or rub  SKIN: no suspicious  lesions or rashes  NEURO: Normal strength and tone, mentation intact and speech normal    Diagnostic test results:  Results for orders placed or performed in visit on 08/17/17 (from the past 24 hour(s))   Basic metabolic panel   Result Value Ref Range    Sodium 137 133 - 144 mmol/L    Potassium 4.6 3.4 - 5.3 mmol/L    Chloride 102 94 - 109 mmol/L    Carbon Dioxide 33 (H) 20 - 32 mmol/L    Anion Gap 2 (L) 3 - 14 mmol/L    Glucose 91 70 - 99 mg/dL    Urea Nitrogen 18 7 - 30 mg/dL    Creatinine 1.13 0.66 - 1.25 mg/dL    GFR Estimate 64 >60 mL/min/1.7m2    GFR Estimate If Black 77 >60 mL/min/1.7m2    Calcium 9.0 8.5 - 10.1 mg/dL   CBC with platelets   Result Value Ref Range    WBC 7.4 4.0 - 11.0 10e9/L    RBC Count 5.14 4.4 - 5.9 10e12/L    Hemoglobin 14.9 13.3 - 17.7 g/dL    Hematocrit 46.1 40.0 - 53.0 %    MCV 90 78 - 100 fl    MCH 29.0 26.5 - 33.0 pg    MCHC 32.3 31.5 - 36.5 g/dL    RDW 13.4 10.0 - 15.0 %    Platelet Count 160 150 - 450 10e9/L          ASSESSMENT/PLAN:                                                    1. Dizziness  Seems benign and more consistent with orthostatic changes  If recurs though may warrant more thorough eval  For now- move aldactone to evening- that way 2 of 4 anti-htns are being taken each day both in am and pm  - CBC with platelets    2. BENIGN HYPERTENSION  As above  - Basic metabolic panel    3. Idiopathic cardiomyopathy (H)        Follow up with Provider - carlyn Negron MD  [facility]

## 2017-08-17 NOTE — NURSING NOTE
"Chief Complaint   Patient presents with     Dizziness       Initial /80  Pulse 58  Temp 97.8  F (36.6  C) (Oral)  Wt 246 lb (111.6 kg)  SpO2 96%  BMI 35.3 kg/m2 Estimated body mass index is 35.3 kg/(m^2) as calculated from the following:    Height as of 3/31/17: 5' 10\" (1.778 m).    Weight as of this encounter: 246 lb (111.6 kg).  Medication Reconciliation: complete    "

## 2017-10-01 DIAGNOSIS — I10 ESSENTIAL HYPERTENSION, BENIGN: ICD-10-CM

## 2017-10-02 RX ORDER — LISINOPRIL AND HYDROCHLOROTHIAZIDE 20; 25 MG/1; MG/1
TABLET ORAL
Qty: 90 TABLET | Refills: 3 | Status: SHIPPED | OUTPATIENT
Start: 2017-10-02 | End: 2018-04-06

## 2017-10-02 NOTE — TELEPHONE ENCOUNTER
lisinopril      Last Written Prescription Date: 03/16/17  Last Fill Quantity: 90, # refills: 1  Last Office Visit with G, P or [facility] prescribing provider: 08/17/17       Potassium   Date Value Ref Range Status   08/17/2017 4.6 3.4 - 5.3 mmol/L Final     Creatinine   Date Value Ref Range Status   08/17/2017 1.13 0.66 - 1.25 mg/dL Final     BP Readings from Last 3 Encounters:   08/17/17 122/80   03/31/17 120/70   03/16/17 146/86

## 2018-03-11 DIAGNOSIS — I10 ESSENTIAL HYPERTENSION, BENIGN: ICD-10-CM

## 2018-03-11 DIAGNOSIS — I42.9 IDIOPATHIC CARDIOMYOPATHY (H): ICD-10-CM

## 2018-03-12 NOTE — TELEPHONE ENCOUNTER
"Requested Prescriptions   Pending Prescriptions Disp Refills     spironolactone (ALDACTONE) 25 MG tablet [Pharmacy Med Name: SPIRONOLACTONE 25MG    TAB]  Last Written Prescription Date:  6/19/17  Last Fill Quantity: 90,  # refills: 2   Last office visit: 8/17/2017 with prescribing provider:  8/17/17   Future Office Visit:     90 tablet 2     Sig: TAKE ONE TABLET BY MOUTH ONCE DAILY    Diuretics (Including Combos) Protocol Passed    3/11/2018  6:29 PM       Passed - Blood pressure under 140/90 in past 12 months    BP Readings from Last 3 Encounters:   08/17/17 122/80   03/31/17 120/70   03/16/17 146/86                Passed - Recent (12 mo) or future (30 days) visit within the authorizing provider's specialty    Patient had office visit in the last 12 months or has a visit in the next 30 days with authorizing provider or within the authorizing provider's specialty.  See \"Patient Info\" tab in inbasket, or \"Choose Columns\" in Meds & Orders section of the refill encounter.           Passed - Patient is age 18 or older       Passed - Normal serum creatinine on file in past 12 months    Recent Labs   Lab Test  08/17/17   1148   CR  1.13             Passed - Normal serum potassium on file in past 12 months    Recent Labs   Lab Test  08/17/17   1148   POTASSIUM  4.6                   Passed - Normal serum sodium on file in past 12 months    Recent Labs   Lab Test  08/17/17   1148   NA  137              "

## 2018-03-13 RX ORDER — SPIRONOLACTONE 25 MG/1
TABLET ORAL
Qty: 90 TABLET | Refills: 1 | Status: SHIPPED | OUTPATIENT
Start: 2018-03-13 | End: 2018-04-06

## 2018-03-15 ENCOUNTER — DOCUMENTATION ONLY (OUTPATIENT)
Dept: LAB | Facility: CLINIC | Age: 73
End: 2018-03-15

## 2018-03-15 DIAGNOSIS — I42.9 IDIOPATHIC CARDIOMYOPATHY (H): ICD-10-CM

## 2018-03-15 DIAGNOSIS — E78.5 HYPERLIPIDEMIA WITH TARGET LDL LESS THAN 130: Primary | ICD-10-CM

## 2018-03-20 DIAGNOSIS — Z11.59 NEED FOR HEPATITIS C SCREENING TEST: ICD-10-CM

## 2018-03-20 DIAGNOSIS — I42.9 IDIOPATHIC CARDIOMYOPATHY (H): ICD-10-CM

## 2018-03-20 DIAGNOSIS — E78.5 HYPERLIPIDEMIA WITH TARGET LDL LESS THAN 130: ICD-10-CM

## 2018-03-20 LAB
ALT SERPL W P-5'-P-CCNC: 34 U/L (ref 0–70)
ANION GAP SERPL CALCULATED.3IONS-SCNC: 7 MMOL/L (ref 3–14)
BUN SERPL-MCNC: 17 MG/DL (ref 7–30)
CALCIUM SERPL-MCNC: 8.4 MG/DL (ref 8.5–10.1)
CHLORIDE SERPL-SCNC: 106 MMOL/L (ref 94–109)
CHOLEST SERPL-MCNC: 123 MG/DL
CO2 SERPL-SCNC: 26 MMOL/L (ref 20–32)
CREAT SERPL-MCNC: 0.92 MG/DL (ref 0.66–1.25)
GFR SERPL CREATININE-BSD FRML MDRD: 81 ML/MIN/1.7M2
GLUCOSE SERPL-MCNC: 103 MG/DL (ref 70–99)
HCV AB SERPL QL IA: NONREACTIVE
HDLC SERPL-MCNC: 33 MG/DL
LDLC SERPL CALC-MCNC: 57 MG/DL
NONHDLC SERPL-MCNC: 90 MG/DL
POTASSIUM SERPL-SCNC: 3.8 MMOL/L (ref 3.4–5.3)
SODIUM SERPL-SCNC: 139 MMOL/L (ref 133–144)
TRIGL SERPL-MCNC: 165 MG/DL

## 2018-03-20 PROCEDURE — 84460 ALANINE AMINO (ALT) (SGPT): CPT | Performed by: INTERNAL MEDICINE

## 2018-03-20 PROCEDURE — 86803 HEPATITIS C AB TEST: CPT | Performed by: INTERNAL MEDICINE

## 2018-03-20 PROCEDURE — 80048 BASIC METABOLIC PNL TOTAL CA: CPT | Performed by: INTERNAL MEDICINE

## 2018-03-20 PROCEDURE — 36415 COLL VENOUS BLD VENIPUNCTURE: CPT | Performed by: INTERNAL MEDICINE

## 2018-03-20 PROCEDURE — 80061 LIPID PANEL: CPT | Performed by: INTERNAL MEDICINE

## 2018-04-06 ENCOUNTER — OFFICE VISIT (OUTPATIENT)
Dept: INTERNAL MEDICINE | Facility: CLINIC | Age: 73
End: 2018-04-06
Payer: COMMERCIAL

## 2018-04-06 VITALS
HEART RATE: 61 BPM | DIASTOLIC BLOOD PRESSURE: 82 MMHG | HEIGHT: 70 IN | BODY MASS INDEX: 36.18 KG/M2 | RESPIRATION RATE: 16 BRPM | TEMPERATURE: 97.6 F | OXYGEN SATURATION: 93 % | WEIGHT: 252.7 LBS | SYSTOLIC BLOOD PRESSURE: 120 MMHG

## 2018-04-06 DIAGNOSIS — I42.9 IDIOPATHIC CARDIOMYOPATHY (H): ICD-10-CM

## 2018-04-06 DIAGNOSIS — Z23 NEED FOR SHINGLES VACCINE: ICD-10-CM

## 2018-04-06 DIAGNOSIS — Z00.00 MEDICARE ANNUAL WELLNESS VISIT, SUBSEQUENT: Primary | ICD-10-CM

## 2018-04-06 DIAGNOSIS — E78.5 HYPERLIPIDEMIA WITH TARGET LDL LESS THAN 130: ICD-10-CM

## 2018-04-06 DIAGNOSIS — Z71.89 ADVANCED DIRECTIVES, COUNSELING/DISCUSSION: ICD-10-CM

## 2018-04-06 DIAGNOSIS — M17.0 PRIMARY OSTEOARTHRITIS OF BOTH KNEES: ICD-10-CM

## 2018-04-06 DIAGNOSIS — I10 ESSENTIAL HYPERTENSION, BENIGN: ICD-10-CM

## 2018-04-06 PROCEDURE — 99213 OFFICE O/P EST LOW 20 MIN: CPT | Mod: 25 | Performed by: INTERNAL MEDICINE

## 2018-04-06 PROCEDURE — 99397 PER PM REEVAL EST PAT 65+ YR: CPT | Performed by: INTERNAL MEDICINE

## 2018-04-06 RX ORDER — LISINOPRIL AND HYDROCHLOROTHIAZIDE 20; 25 MG/1; MG/1
1 TABLET ORAL DAILY
Qty: 90 TABLET | Refills: 3 | Status: SHIPPED | OUTPATIENT
Start: 2018-04-06 | End: 2018-06-04

## 2018-04-06 RX ORDER — SPIRONOLACTONE 25 MG/1
25 TABLET ORAL DAILY
Qty: 90 TABLET | Refills: 3 | Status: SHIPPED | OUTPATIENT
Start: 2018-04-06 | End: 2018-06-04

## 2018-04-06 RX ORDER — CARVEDILOL 25 MG/1
25 TABLET ORAL 2 TIMES DAILY WITH MEALS
Qty: 180 TABLET | Refills: 1 | Status: SHIPPED | OUTPATIENT
Start: 2018-04-06 | End: 2018-06-04

## 2018-04-06 RX ORDER — SIMVASTATIN 40 MG
40 TABLET ORAL AT BEDTIME
Qty: 90 TABLET | Refills: 3 | Status: SHIPPED | OUTPATIENT
Start: 2018-04-06 | End: 2018-06-04

## 2018-04-06 ASSESSMENT — PAIN SCALES - GENERAL: PAINLEVEL: NO PAIN (0)

## 2018-04-06 ASSESSMENT — ACTIVITIES OF DAILY LIVING (ADL)
CURRENT_FUNCTION: NO ASSISTANCE NEEDED
I_NEED_ASSISTANCE_FOR_THE_FOLLOWING_DAILY_ACTIVITIES:: NO ASSISTANCE IS NEEDED

## 2018-04-06 NOTE — PROGRESS NOTES
SUBJECTIVE:   Ej George is a 72 year old male who presents for Preventive Visit.  Are you in the first 12 months of your Medicare coverage?  No    Physical   Annual:     Getting at least 3 servings of Calcium per day::  Yes    Bi-annual eye exam::  Yes    Dental care twice a year::  Yes    Sleep apnea or symptoms of sleep apnea::  None    Diet::  Low salt    Frequency of exercise::  None    Taking medications regularly::  Yes    Medication side effects::  None    Additional concerns today::  No    Ability to successfully perform activities of daily living: no assistance needed  Home Safety:  No safety concerns identified  Hearing Impairment: difficulty following a conversation in a noisy restaurant or crowded room      Ability to successfully perform activities of daily living: Yes, no assistance needed  Home safety:  none identified   Hearing impairment: Yes, Difficulty following a conversation in a noisy restaurant or crowded room.    Fall risk:  Fallen 2 or more times in the past year?: No  Any fall with injury in the past year?: No    COGNITIVE SCREEN  1) Repeat 3 items (Banana, Sunrise, Chair)    2) Clock draw: NORMAL  3) 3 item recall: Recalls 2 objects   Results: NORMAL clock, 1-2 items recalled: COGNITIVE IMPAIRMENT LESS LIKELY    Mini-CogTM Copyright JASMYNE Orr. Licensed by the author for use in [facility]; reprinted with permission (solinnea@[facility]). All rights reserved.        Reviewed and updated as needed this visit by clinical staff  Tobacco  Allergies  Meds         Reviewed and updated as needed this visit by Provider        Social History   Substance Use Topics     Smoking status: Former Smoker     Packs/day: 0.50     Years: 14.00     Types: Cigarettes     Start date: 1/1/1961     Quit date: 1/1/1975     Smokeless tobacco: Never Used     Alcohol use 0.6 oz/week     1 Standard drinks or equivalent per week      Comment: occasionally       Alcohol Use 4/6/2018   If you drink alcohol  "do you typically have greater than 3 drinks per day OR greater than 7 drinks per week? No       Today's PHQ-2 Score:   PHQ-2 ( 1999 [employer]) 4/6/2018   Q1: Little interest or pleasure in doing things 0   Q2: Feeling down, depressed or hopeless 0   PHQ-2 Score 0   Q1: Little interest or pleasure in doing things Not at all   Q2: Feeling down, depressed or hopeless Not at all   PHQ-2 Score 0       Do you feel safe in your environment - Yes    Do you have a Health Care Directive?: Yes: Advance Directive has been received and scanned.    Current providers sharing in care for this patient include:   Patient Care Team:  Abiel Negron MD as PCP - General    The following health maintenance items are reviewed in Epic and correct as of today:  Health Maintenance   Topic Date Due     HF ACTION PLAN Q3 YR  1945     TETANUS Q10 YR  07/02/2018     FALL RISK ASSESSMENT  08/17/2018     CBC Q1 YR  08/17/2018     INFLUENZA VACCINE (SYSTEM ASSIGNED)  09/01/2018     BMP Q6 MOS  09/20/2018     ALT Q1 YR  03/20/2019     BMP Q1 YR  03/20/2019     LIPID MONITORING Q1 YEAR  03/20/2019     COLONOSCOPY Q5 YR  04/27/2020     ADVANCE DIRECTIVE PLANNING Q5 YRS  04/06/2023     PNEUMOCOCCAL  Completed     AORTIC ANEURYSM SCREENING (SYSTEM ASSIGNED)  Completed     HEPATITIS C SCREENING  Completed     He states he has been using over-the-counter Aleve or ibuprofen 2-3 times a day when needed  for some time due to his arthritis.      Review of Systems  Constitutional, HEENT, cardiovascular, pulmonary, GI, , musculoskeletal, neuro, skin, endocrine and psych systems are negative, except as otherwise noted.    OBJECTIVE:   /82  Pulse 61  Temp 97.6  F (36.4  C) (Oral)  Resp 16  Ht 5' 10\" (1.778 m)  Wt 252 lb 11.2 oz (114.6 kg)  SpO2 93%  BMI 36.26 kg/m2 Estimated body mass index is 36.26 kg/(m^2) as calculated from the following:    Height as of this encounter: 5' 10\" (1.778 m).    Weight as of this encounter: 252 lb 11.2 oz " (114.6 kg).  Physical Exam  GENERAL: alert, no distress, and over weight  EYES: Eyes grossly normal to inspection, PERRL and conjunctivae and sclerae normal  HENT: ear canals and TM's normal, nose and mouth without ulcers or lesions  NECK: no adenopathy, no asymmetry, masses, or scars and thyroid normal to palpation  RESP: lungs clear to auscultation - no rales, rhonchi or wheezes  CV: regular rate and rhythm, normal S1 S2, no S3 or S4, no murmur, click or rub, no peripheral edema and peripheral pulses strong  ABDOMEN: soft, nontender, no hepatosplenomegaly, no masses and bowel sounds normal  MS: no gross musculoskeletal defects noted, no edema  SKIN: no suspicious lesions or rashes  NEURO: Normal strength and tone, mentation intact and speech normal  PSYCH: mentation appears normal, affect normal/bright  LYMPH: no cervical, supraclavicular, axillary, or inguinal adenopathy    Results for orders placed or performed in visit on 03/20/18   **Basic metabolic panel FUTURE 14d   Result Value Ref Range    Sodium 139 133 - 144 mmol/L    Potassium 3.8 3.4 - 5.3 mmol/L    Chloride 106 94 - 109 mmol/L    Carbon Dioxide 26 20 - 32 mmol/L    Anion Gap 7 3 - 14 mmol/L    Glucose 103 (H) 70 - 99 mg/dL    Urea Nitrogen 17 7 - 30 mg/dL    Creatinine 0.92 0.66 - 1.25 mg/dL    GFR Estimate 81 >60 mL/min/1.7m2    GFR Estimate If Black >90 >60 mL/min/1.7m2    Calcium 8.4 (L) 8.5 - 10.1 mg/dL   ALT   Result Value Ref Range    ALT 34 0 - 70 U/L   Lipid Profile   Result Value Ref Range    Cholesterol 123 <200 mg/dL    Triglycerides 165 (H) <150 mg/dL    HDL Cholesterol 33 (L) >39 mg/dL    LDL Cholesterol Calculated 57 <100 mg/dL    Non HDL Cholesterol 90 <130 mg/dL   Hepatitis C Screen Reflex to HCV RNA Quant and Genotype   Result Value Ref Range    Hepatitis C Antibody Nonreactive NR^Nonreactive      ASSESSMENT / PLAN:   1. Medicare annual wellness visit, subsequent  See pt instructions. uptodate on screening    2. Advanced directives,  "counseling/discussion      3. Hyperlipidemia with target LDL less than 130  At goal   - simvastatin (ZOCOR) 40 MG tablet; Take 1 tablet (40 mg) by mouth At Bedtime  Dispense: 90 tablet; Refill: 3    4. Idiopathic cardiomyopathy (H)  - carvedilol (COREG) 25 MG tablet; Take 1 tablet (25 mg) by mouth 2 times daily (with meals)  Dispense: 180 tablet; Refill: 1  - spironolactone (ALDACTONE) 25 MG tablet; Take 1 tablet (25 mg) by mouth daily  Dispense: 90 tablet; Refill: 3    5. BENIGN HYPERTENSION  - carvedilol (COREG) 25 MG tablet; Take 1 tablet (25 mg) by mouth 2 times daily (with meals)  Dispense: 180 tablet; Refill: 1  - lisinopril-hydrochlorothiazide (PRINZIDE/ZESTORETIC) 20-25 MG per tablet; Take 1 tablet by mouth daily  Dispense: 90 tablet; Refill: 3  - spironolactone (ALDACTONE) 25 MG tablet; Take 1 tablet (25 mg) by mouth daily  Dispense: 90 tablet; Refill: 3    6. Need for shingles vaccine  - ZOSTER VACCINE RECOMBINANT ADJUVANTED IM NJX (SHINGRIX)    7. Primary osteoarthritis of both knees  Try topical nsaid vs regular use of oral nsaid  - diclofenac (VOLTAREN) 1 % GEL topical gel; Apply 4 grams to knees four times daily using enclosed dosing card.  Dispense: 100 g; Refill: 11  - OFFICE/OUTPT VISIT,ESTLEVL III    End of Life Planning:  Patient currently has an advanced directive: Yes.  Practitioner is supportive of decision.    COUNSELING:  Reviewed preventive health counseling, as reflected in patient instructions        Estimated body mass index is 36.26 kg/(m^2) as calculated from the following:    Height as of this encounter: 5' 10\" (1.778 m).    Weight as of this encounter: 252 lb 11.2 oz (114.6 kg).     reports that he quit smoking about 43 years ago. His smoking use included Cigarettes. He started smoking about 57 years ago. He has a 7.00 pack-year smoking history. He has never used smokeless tobacco.      Appropriate preventive services were discussed with this patient, including applicable screening " as appropriate for cardiovascular disease, diabetes, osteopenia/osteoporosis, and glaucoma.  As appropriate for age/gender, discussed screening for colorectal cancer, prostate cancer, breast cancer, and cervical cancer. Checklist reviewing preventive services available has been given to the patient.    Reviewed patients plan of care and provided an AVS. The Basic Care Plan (routine screening as documented in Health Maintenance) for Ej meets the Care Plan requirement. This Care Plan has been established and reviewed with the Patient.    Counseling Resources:  ATP IV Guidelines  Pooled Cohorts Equation Calculator  Breast Cancer Risk Calculator  FRAX Risk Assessment  ICSI Preventive Guidelines  Dietary Guidelines for Americans, 2010  USDA's MyPlate  ASA Prophylaxis  Lung CA Screening    Abiel Negron MD  [facility]  Answers for HPI/ROS submitted by the patient on 4/6/2018   PHQ-2 Score: 0

## 2018-04-06 NOTE — MR AVS SNAPSHOT
After Visit Summary   4/6/2018    Ej George    MRN: [id number]           Patient Information     Date Of Birth          1945        Visit Information        Provider Department      4/6/2018 9:00 AM Abiel Negron MD [facility]        Today's Diagnoses     Medicare annual wellness visit, subsequent    -  1    Advanced directives, counseling/discussion        Hyperlipidemia with target LDL less than 130        Idiopathic cardiomyopathy (H)        BENIGN HYPERTENSION        Need for shingles vaccine        Primary osteoarthritis of both knees          Care Instructions      Preventive Health Recommendations:       Male Ages 65 and over    Yearly exam:             See your health care provider every year in order to  o   Review health changes.   o   Discuss preventive care.    o   Review your medicines if your doctor has prescribed any.    Talk with your health care provider about whether you should have a test to screen for prostate cancer (PSA).    Every 3 years, have a diabetes test (fasting glucose). If you are at risk for diabetes, you should have this test more often.    Every 5 years, have a cholesterol test. Have this test more often if you are at risk for high cholesterol or heart disease.     Every 10 years, have a colonoscopy. Or, have a yearly FIT test (stool test). These exams will check for colon cancer.    Talk to with your health care provider about screening for Abdominal Aortic Aneurysm if you have a family history of AAA or have a history of smoking.  Shots:     Get a flu shot each year.     Get a tetanus shot every 10 years.     Talk to your doctor about your pneumonia vaccines. There are now two you should receive - Pneumovax (PPSV 23) and Prevnar (PCV 13).    Talk to your doctor about a shingles vaccine.     Talk to your doctor about the hepatitis B vaccine.  Nutrition:     Eat at least 5 servings of fruits and vegetables each day.     Eat  whole-grain bread, whole-wheat pasta and brown rice instead of white grains and rice.     Talk to your doctor about Calcium and Vitamin D.   Lifestyle    Exercise for at least 150 minutes a week (30 minutes a day, 5 days a week). This will help you control your weight and prevent disease.     Limit alcohol to one drink per day.     No smoking.     Wear sunscreen to prevent skin cancer.     See your dentist every six months for an exam and cleaning.     See your eye doctor every 1 to 2 years to screen for conditions such as glaucoma, macular degeneration and cataracts.          Follow-ups after your visit        Your next 10 appointments already scheduled     Jun 04, 2018  3:15 PM CDT   Return Visit with Mani Rodríguez MD   [facility] ([facility])    [address] [address] [address] [phone]              Who to contact     If you have questions or need follow up information about today's clinic visit or your schedule please contact [facility] directly at [phone].  Normal or non-critical lab and imaging results will be communicated to you by Jirafehart, letter or phone within 4 business days after the clinic has received the results. If you do not hear from us within 7 days, please contact the clinic through Pl[employer] Logict or phone. If you have a critical or abnormal lab result, we will notify you by phone as soon as possible.  Submit refill requests through StyleChat by Pro[employer] Mobile or call your pharmacy and they will forward the refill request to us. Please allow 3 business days for your refill to be completed.          Additional Information About Your Visit        StyleChat by [employer] Information     StyleChat by [employer] gives you secure access to your electronic health record. If you see a primary care provider, you can also send messages to your care team and make appointments. If you have questions, please call your primary care clinic.  If you do not have  "a primary care provider, please call [phone] and they will assist you.        Care EveryWhere ID     This is your Care EveryWhere ID. This could be used by other organizations to access your [place] medical records  [id number]        Your Vitals Were     Pulse Temperature Respirations Height Pulse Oximetry BMI (Body Mass Index)    61 97.6  F (36.4  C) (Oral) 16 5' 10\" (1.778 m) 93% 36.26 kg/m2       Blood Pressure from Last 3 Encounters:   04/06/18 120/82   08/17/17 122/80   03/31/17 120/70    Weight from Last 3 Encounters:   04/06/18 252 lb 11.2 oz (114.6 kg)   08/17/17 246 lb (111.6 kg)   03/31/17 244 lb (110.7 kg)              We Performed the Following     ZOSTER VACCINE RECOMBINANT ADJUVANTED IM NJX (SHINGRIX)          Today's Medication Changes          These changes are accurate as of 4/6/18  9:41 AM.  If you have any questions, ask your nurse or doctor.               Start taking these medicines.        Dose/Directions    diclofenac 1 % Gel topical gel   Commonly known as:  VOLTAREN   Used for:  Primary osteoarthritis of both knees   Started by:  Abiel Negron MD        Apply 4 grams to knees four times daily using enclosed dosing card.   Quantity:  100 g   Refills:  11         These medicines have changed or have updated prescriptions.        Dose/Directions    lisinopril-hydrochlorothiazide 20-25 MG per tablet   Commonly known as:  PRINZIDE/ZESTORETIC   This may have changed:  See the new instructions.   Used for:  Essential hypertension, benign   Changed by:  Abiel Negron MD        Dose:  1 tablet   Take 1 tablet by mouth daily   Quantity:  90 tablet   Refills:  3       spironolactone 25 MG tablet   Commonly known as:  ALDACTONE   This may have changed:  See the new instructions.   Used for:  Idiopathic cardiomyopathy (H), Essential hypertension, benign   Changed by:  Abiel Negron MD        Dose:  25 mg   Take 1 tablet (25 mg) by mouth daily   Quantity:  90 tablet   Refills:  3 "            Where to get your medicines      These medications were sent to [id number] - [place], [place] - [address]  [address], [place] [address]     Phone:  [phone]     carvedilol 25 MG tablet    diclofenac 1 % Gel topical gel    lisinopril-hydrochlorothiazide 20-25 MG per tablet    simvastatin 40 MG tablet    spironolactone 25 MG tablet                Primary Care Provider Office Phone # Fax #    Abiel Negron [phone] [address] [facility] [address]        Equal Access to Services     [facility]: Hadii aad ku hadasho Soomaali, waaxda luqadaha, qaybta kaalmada adeegyada, yousif berger . So [facility] [place] [phone].    ATENCIÓN: Si habla español, tiene a vieira disposición servicios gratuitos de asistencia lingüística. [facility] [phone].    We comply with applicable federal civil rights laws and [place] laws. We do not discriminate on the basis of race, color, national origin, age, disability, sex, sexual orientation, or gender identity.            Thank you!     Thank you for choosing [facility][1]  for your care. Our goal is always to provide you with excellent care. Hearing back from our patients is one way we can continue to improve our services. Please take a few minutes to complete the written survey that you may receive in the mail after your visit with us. Thank you!             Your Updated Medication List - Protect others around you: Learn how to safely use, store and throw away your medicines at www.disposemymeds.org.          This list is accurate as of 4/6/18  9:41 AM.  Always use your most recent med list.                   Brand Name Dispense Instructions for use Diagnosis    aspirin 81 MG tablet     0    take one tablet daily with food        carvedilol 25 MG tablet    COREG    180 tablet    Take 1 tablet (25 mg) by mouth 2 times daily (with meals)    Idiopathic  cardiomyopathy (H), Essential hypertension, benign       diclofenac 1 % Gel topical gel    VOLTAREN    100 g    Apply 4 grams to knees four times daily using enclosed dosing card.    Primary osteoarthritis of both knees       glucosamine 500 MG Caps     60    1 tablet twice daily        lisinopril-hydrochlorothiazide 20-25 MG per tablet    PRINZIDE/ZESTORETIC    90 tablet    Take 1 tablet by mouth daily    Essential hypertension, benign       omeprazole 20 MG CR capsule    priLOSEC    93 Cap    take 30'-60' before 1st meal daily    Esophageal reflux       order for DME     4    Jobst stockings    Unspecified venous (peripheral) insufficiency       simvastatin 40 MG tablet    ZOCOR    90 tablet    Take 1 tablet (40 mg) by mouth At Bedtime    Hyperlipidemia with target LDL less than 130       spironolactone 25 MG tablet    ALDACTONE    90 tablet    Take 1 tablet (25 mg) by mouth daily    Idiopathic cardiomyopathy (H), Essential hypertension, benign       triamcinolone 0.1 % ointment    KENALOG    454 g    Apply to AA BID x 1-2 weeks then PRN    Chronic dermatitis of hands       RITO-MIN TABS   OR     30    1 TABLET DAILY

## 2018-04-09 ENCOUNTER — TELEPHONE (OUTPATIENT)
Dept: INTERNAL MEDICINE | Facility: CLINIC | Age: 73
End: 2018-04-09

## 2018-04-09 DIAGNOSIS — M17.9 OSTEOARTHRITIS OF KNEE, UNSPECIFIED LATERALITY, UNSPECIFIED OSTEOARTHRITIS TYPE: Primary | ICD-10-CM

## 2018-04-09 NOTE — TELEPHONE ENCOUNTER
Reason for Call:  Other call back    Detailed comments: Pt has a question about diclofenac topical gel.  It's expensive, and pt wants to know if there is an over the counter med that he can use.    Phone Number Patient can be reached at: Home number on file [phone] (home)    Best Time: anytime    Can we leave a detailed message on this number? YES    Call taken on 4/9/2018 at 3:32 PM by TIO TUTTLE

## 2018-04-11 NOTE — TELEPHONE ENCOUNTER
Looking into compounded ketoprofen cream- otherwise aspercreme would be the closest otc product.   Pharmacy is going to get back to me on the cost $$

## 2018-04-13 NOTE — TELEPHONE ENCOUNTER
Talked to pharmacy, they don't have ketoprofen but they have asper cream with and without Lidocaine.    Dr Lira, which one do you prefer?

## 2018-04-20 NOTE — TELEPHONE ENCOUNTER
Althea from [facility]: questions about dosing for the ketoprofen? They have an applicator for it and how much each application? Call Althea back at [phone]

## 2018-04-24 NOTE — TELEPHONE ENCOUNTER
Called gigi at [facility] and she states she had a verbal from someone to cancel the order.

## 2018-06-04 ENCOUNTER — OFFICE VISIT (OUTPATIENT)
Dept: CARDIOLOGY | Facility: CLINIC | Age: 73
End: 2018-06-04
Attending: INTERNAL MEDICINE
Payer: COMMERCIAL

## 2018-06-04 VITALS
DIASTOLIC BLOOD PRESSURE: 80 MMHG | BODY MASS INDEX: 36.29 KG/M2 | SYSTOLIC BLOOD PRESSURE: 132 MMHG | HEART RATE: 66 BPM | WEIGHT: 253.5 LBS | HEIGHT: 70 IN

## 2018-06-04 DIAGNOSIS — I10 ESSENTIAL HYPERTENSION, BENIGN: ICD-10-CM

## 2018-06-04 DIAGNOSIS — E66.3 OVER WEIGHT: Primary | ICD-10-CM

## 2018-06-04 DIAGNOSIS — I42.9 IDIOPATHIC CARDIOMYOPATHY (H): ICD-10-CM

## 2018-06-04 DIAGNOSIS — E78.5 HYPERLIPIDEMIA WITH TARGET LDL LESS THAN 130: ICD-10-CM

## 2018-06-04 PROCEDURE — 99214 OFFICE O/P EST MOD 30 MIN: CPT | Performed by: INTERNAL MEDICINE

## 2018-06-04 RX ORDER — CARVEDILOL 25 MG/1
25 TABLET ORAL 2 TIMES DAILY WITH MEALS
Qty: 180 TABLET | Refills: 3 | Status: SHIPPED | OUTPATIENT
Start: 2018-06-04 | End: 2019-01-15

## 2018-06-04 RX ORDER — LISINOPRIL AND HYDROCHLOROTHIAZIDE 20; 25 MG/1; MG/1
1 TABLET ORAL DAILY
Qty: 90 TABLET | Refills: 3 | Status: SHIPPED | OUTPATIENT
Start: 2018-06-04 | End: 2019-01-15

## 2018-06-04 RX ORDER — SPIRONOLACTONE 25 MG/1
25 TABLET ORAL DAILY
Qty: 90 TABLET | Refills: 3 | Status: SHIPPED | OUTPATIENT
Start: 2018-06-04 | End: 2019-01-15

## 2018-06-04 RX ORDER — SIMVASTATIN 40 MG
40 TABLET ORAL AT BEDTIME
Qty: 90 TABLET | Refills: 3 | Status: SHIPPED | OUTPATIENT
Start: 2018-06-04 | End: 2019-01-15

## 2018-06-04 NOTE — MR AVS SNAPSHOT
"              After Visit Summary   6/4/2018    Ej George    MRN: [id number]           Patient Information     Date Of Birth          1945        Visit Information        Provider Department      6/4/2018 3:15 PM Mani Rodríguez MD [facility]        Today's Diagnoses     Over weight    -  1    Idiopathic cardiomyopathy (H)        Hyperlipidemia with target LDL less than 130        BENIGN HYPERTENSION           Follow-ups after your visit        Who to contact     If you have questions or need follow up information about today's clinic visit or your schedule please contact [facility] directly at [phone].  Normal or non-critical lab and imaging results will be communicated to you by LiveRail[employer], letter or phone within 4 business days after the clinic has received the results. If you do not hear from us within 7 days, please contact the clinic through Taodangput or phone. If you have a critical or abnormal lab result, we will notify you by phone as soon as possible.  Submit refill requests through [employer] or call your pharmacy and they will forward the refill request to us. Please allow 3 business days for your refill to be completed.          Additional Information About Your Visit        MyChart Information     [employer] gives you secure access to your electronic health record. If you see a primary care provider, you can also send messages to your care team and make appointments. If you have questions, please call your primary care clinic.  If you do not have a primary care provider, please call [phone] and they will assist you.        Care EveryWhere ID     This is your Care EveryWhere ID. This could be used by other organizations to access your [facility] medical records  [id number]        Your Vitals Were     Pulse Height BMI (Body Mass Index)             66 1.778 m (5' 10\") 36.37 kg/m2          Blood Pressure " from Last 3 Encounters:   06/04/18 132/80   04/06/18 120/82   08/17/17 122/80    Weight from Last 3 Encounters:   06/04/18 115 kg (253 lb 8 oz)   04/06/18 114.6 kg (252 lb 11.2 oz)   08/17/17 111.6 kg (246 lb)              We Performed the Following     Follow-Up with Cardiologist          Where to get your medicines      These medications were sent to [facility] - [place], [place] - [address]  [address] [place] [address]     Phone:  [phone]     carvedilol 25 MG tablet    lisinopril-hydrochlorothiazide 20-25 MG per tablet    simvastatin 40 MG tablet    spironolactone 25 MG tablet          Primary Care Provider Office Phone # Fax #    Abiel Negron [phone]       [address] [place] [id number]        Equal Access to Services     [facility]: Hadii kunal gomeso Soisai, waaxda luqadaha, qaybta kaalmada adeegyada, yousif berger . So [facility] [phone].    ATENCIÓN: Si rober espramirez, tiene a vieira disposición servicios gratuitos de asistencia lingüística. [facility] [phone].    We comply with applicable federal civil rights laws and [place] laws. We do not discriminate on the basis of race, color, national origin, age, disability, sex, sexual orientation, or gender identity.            Thank you!     Thank you for choosing [facility]  for your care. Our goal is always to provide you with excellent care. Hearing back from our patients is one way we can continue to improve our services. Please take a few minutes to complete the written survey that you may receive in the mail after your visit with us. Thank you!             Your Updated Medication List - Protect others around you: Learn how to safely use, store and throw away your medicines at www.disposemymeds.org.          This list is accurate as of 6/4/18  3:55 PM.  Always use your most recent med list.                    Brand Name Dispense Instructions for use Diagnosis    aspirin 81 MG tablet     0    take one tablet daily with food        carvedilol 25 MG tablet    COREG    180 tablet    Take 1 tablet (25 mg) by mouth 2 times daily (with meals)    Idiopathic cardiomyopathy (H), Essential hypertension, benign       glucosamine 500 MG Caps     60    1 tablet twice daily        lisinopril-hydrochlorothiazide 20-25 MG per tablet    PRINZIDE/ZESTORETIC    90 tablet    Take 1 tablet by mouth daily    Essential hypertension, benign       omeprazole 20 MG CR capsule    priLOSEC    93 Cap    take 30'-60' before 1st meal daily    Esophageal reflux       order for DME     4    Jobst stockings    Unspecified venous (peripheral) insufficiency       simvastatin 40 MG tablet    ZOCOR    90 tablet    Take 1 tablet (40 mg) by mouth At Bedtime    Hyperlipidemia with target LDL less than 130       spironolactone 25 MG tablet    ALDACTONE    90 tablet    Take 1 tablet (25 mg) by mouth daily    Idiopathic cardiomyopathy (H), Essential hypertension, benign       triamcinolone 0.1 % ointment    KENALOG    454 g    Apply to AA BID x 1-2 weeks then PRN    Chronic dermatitis of hands       RITO-MIN TABS   OR     30    1 TABLET DAILY

## 2018-06-04 NOTE — LETTER
6/4/2018    Abiel Negron MD  [address] [facility] [address]    RE: Ej George       Dear Colleague,    I had the pleasure of seeing Ej George in the [facility].       I saw Gregory George today.  Gregory has had a chronic mild to moderate to nonischemic cardiomyopathy with ejection fractions in the 40%-45% range.  He generally takes meticulous care of himself.  He watches what he eats, and his weight has been relatively stable.  He has a history of hypertension that has been well-controlled and he relates that he recently added spironolactone to his program because of some mild hypertension.   VITAL SIGNS:  His blood pressure is 120/70, heart rate 70, he weighed 244 pounds.   NECK:  He had no neck vein distention or bruits.   HEART:  Regular without gallop or murmur.   LUNGS:  Clear.   ABDOMEN:  Soft, firm, obese, nontender.   EXTREMITIES:  Free of edema.       His current program is:   1.  Carvedilol 25 mg b.i.d.   2.  Lisinopril/hydrochlorothiazide 20/25 mg a day.   3.  Spironolactone 25 mg daily.   4.  Aspirin 81 mg a day.   5.  Omeprazole 20 mg a day.     6.  He is also taking some p.r.n. vitamins, glucosamine, etc.     HPI and Plan:   See dictation    Echo done 3/2017:  The ascending aorta is Mildly dilated.  The right ventricle is mild to moderately dilated.  Left ventricular systolic function is normal.  Grade I or early diastolic dysfunction.  The study was technically difficult. Contrast could not be used due to  inability to obtain IV access.  _____________________________________________________________________________  __        Left Ventricle  The left ventricle is normal in size. There is normal left ventricular wall  thickness. Left ventricular systolic function is normal. The visual ejection  fraction is estimated at 55-60%. Grade I or early diastolic dysfunction. E by  E prime ratio is less than 8, that likely suggests normal left  ventricular  filling pressures. Regional wall motion abnormalities cannot be excluded due  to limited visualization. No consistent gross regional wall motion  abnormalities are seen but image quality is suboptimal and sensitivity is  reduced.     Right Ventricle  The right ventricle is mild to moderately dilated. The right ventricle is not  well visualized. The right ventricular systolic function is normal.         No orders of the defined types were placed in this encounter.    No orders of the defined types were placed in this encounter.    There are no discontinued medications.      Encounter Diagnoses   Name Primary?     Idiopathic cardiomyopathy (H)      Hyperlipidemia with target LDL less than 130      BENIGN HYPERTENSION        CURRENT MEDICATIONS:  Current Outpatient Prescriptions   Medication Sig Dispense Refill     ASPIRIN 81 MG OR TABS take one tablet daily with food 0 0     carvedilol (COREG) 25 MG tablet Take 1 tablet (25 mg) by mouth 2 times daily (with meals) 180 tablet 1     GLUCOSAMINE 500 MG OR CAPS 1 tablet twice daily 60 5     lisinopril-hydrochlorothiazide (PRINZIDE/ZESTORETIC) 20-25 MG per tablet Take 1 tablet by mouth daily 90 tablet 3     OMEPRAZOLE 20 MG PO CPDR take 30'-60' before 1st meal daily 93 Cap 3     ORDER FOR DME Jobst stockings 4 5     simvastatin (ZOCOR) 40 MG tablet Take 1 tablet (40 mg) by mouth At Bedtime 90 tablet 3     spironolactone (ALDACTONE) 25 MG tablet Take 1 tablet (25 mg) by mouth daily 90 tablet 3     triamcinolone (KENALOG) 0.1 % ointment Apply to AA BID x 1-2 weeks then  g 3     RITO-MIN TABS   OR 1 TABLET DAILY 30 5       ALLERGIES     Allergies   Allergen Reactions     No Known Allergies        PAST MEDICAL HISTORY:  Past Medical History:   Diagnosis Date     CHF (congestive heart failure) (H)     EF 40-45%. now 55%     Eczema      Esophageal reflux      Essential hypertension, benign      Hyperlipidemia LDL goal < 130      Idiopathic cardiomyopathy (H)       Ulcerative (chronic) proctitis (H)      Varicose veins of leg        PAST SURGICAL HISTORY:  Past Surgical History:   Procedure Laterality Date     ENDOSCOPIC STRIPPING VEIN(S)       HC REPAIR ROTATOR CUFF,ACUTE  8/10/05    right       FAMILY HISTORY:  Family History   Problem Relation Age of Onset     HEART DISEASE Mother 62     d:age 62 MI     HEART DISEASE Father 86     D:86 chf     CANCER Sister      d:age 58 liver     Family History Negative Sister      b:1934     Hypertension Sister      b:1942     CEREBROVASCULAR DISEASE Brother           DIABETES Brother      Dementia Brother      Prostate Cancer Brother 65     Lung Cancer Brother      CANCER Son 35     brain cancer - surgery with seizures     C.A.D. Son 36     tobacco       SOCIAL HISTORY:  Social History     Social History     Marital status:      Spouse name: Elizabeth     Number of children: 4     Years of education: 14     Occupational History     tool       [employer]     Social History Main Topics     Smoking status: Former Smoker     Packs/day: 0.50     Years: 14.00     Types: Cigarettes     Start date: 1/1/1961     Quit date: 1/1/1975     Smokeless tobacco: Never Used     Alcohol use 0.6 oz/week     1 Standard drinks or equivalent per week      Comment: occasionally     Drug use: No     Sexual activity: Yes     Partners: Female     Other Topics Concern     Caffeine Concern Yes     4-5 cups daily     Sleep Concern No     Stress Concern No     Weight Concern Yes     watches it     Special Diet Yes     low sodium     Exercise Yes     walking at work     Social History Narrative         Review of Systems:  Skin:  Positive for scaling;itching dry skin -  uses a lotion for it    Eyes:  Positive for glasses    ENT:  Positive for hearing loss    Respiratory:  Positive for dyspnea on exertion;cough occas dry cough ,  snores    Cardiovascular:  Negative      Gastroenterology: Positive for reflux reflux under control  "  Genitourinary:  Negative      Musculoskeletal:  Positive for joint pain;joint stiffness;joint swelling;nocturnal cramping both knees,  ocas night cramps   Neurologic:  Negative      Psychiatric:  Positive for excessive stress son is ill with Brain tumor   Heme/Lymph/Imm:  Negative      Endocrine:  Negative        Physical Exam:  Vitals: /80 (BP Location: Right arm, Patient Position: Sitting, Cuff Size: Adult Large)  Pulse 66  Ht 1.778 m (5' 10\")  Wt 115 kg (253 lb 8 oz)  BMI 36.37 kg/m2    Constitutional:           Skin:             Head:           Eyes:           Lymph:      ENT:           Neck:           Respiratory:            Cardiac:                                                           GI:           Extremities and Muscular Skeletal:                 Neurological:           Psych:           Recent Lab Results:  LIPID RESULTS:  Lab Results   Component Value Date    CHOL 123 03/20/2018    HDL 33 (L) 03/20/2018    LDL 57 03/20/2018    TRIG 165 (H) 03/20/2018    CHOLHDLRATIO 2.6 02/13/2015       LIVER ENZYME RESULTS:  Lab Results   Component Value Date    AST 38 05/10/2011    ALT 34 03/20/2018       CBC RESULTS:  Lab Results   Component Value Date    WBC 7.4 08/17/2017    RBC 5.14 08/17/2017    HGB 14.9 08/17/2017    HCT 46.1 08/17/2017    MCV 90 08/17/2017    MCH 29.0 08/17/2017    MCHC 32.3 08/17/2017    RDW 13.4 08/17/2017     08/17/2017       BMP RESULTS:  Lab Results   Component Value Date     03/20/2018    POTASSIUM 3.8 03/20/2018    CHLORIDE 106 03/20/2018    CO2 26 03/20/2018    ANIONGAP 7 03/20/2018     (H) 03/20/2018    BUN 17 03/20/2018    CR 0.92 03/20/2018    GFRESTIMATED 81 03/20/2018    GFRESTBLACK >90 03/20/2018    SAMIR 8.4 (L) 03/20/2018        A1C RESULTS:  No results found for: A1C    INR RESULTS:  No results found for: INR        CC  Mani Rodríguez MD  [address]  [place] [address]                    Service Date: 06/04/2018      HISTORY OF " PRESENT ILLNESS:  I saw Gregory George today, who is 72.  He continues to work full-time.  He showed last year a nice recovery of what I would consider a nonischemic cardiomyopathy.  His EF was in the 40% range.  With medication therapy, his EF recovered completely.  In the past year, he has had no change in his breathing.  He denies chest pain, palpitations, shortness of breath, orthopnea or lower leg edema.      His program is rather typical includin.  Carvedilol 25 mg b.i.d.   2.  Simvastatin 40 mg at bedtime.   3.  Aldactone 25 mg a day.   4.  Lisinopril/hydrochlorothiazide 20/25 mg daily.      On this program, he has been essentially asymptomatic.  His blood pressures have been normal.      He has tended to be a little on the overweight side at 253 pounds.  Other than that, he has been pretty healthy.  On the statin therapy his LDL has been 57, triglycerides 165, which is actually the first time they have been increased, that supports some lack of calorie control.  Creatinine 0.9, BUN 17.      His LVEF by echo last year was 55%-60%.  There was some diastolic dysfunction of the left ventricle.  The right ventricle showed normal systolic function but possibly mildly dilated.  He did not show signs of right heart failure otherwise.      PHYSICAL EXAMINATION:   VITAL SIGNS:  Blood pressure 130/80, heart rate 66 beats a minute and regular.     NECK:  He had no neck vein distention or bruits at 30 degrees.   HEART:  Heart tones are regular and distant without gallop or murmur.   LUNGS:  Clear.   ABDOMEN:  Soft without organomegaly.   EXTREMITIES:  Free of edema with +1 pedal pulses.      Accordingly, I reviewed each of the medicines.  I reviewed my pattern that in situations like this and in the context of hypertension, I tend to continue the ACE, beta blocker, statin program.  I made no changes.  I did not order any tests and did not order any studies currently.  I did ask him to get an echo in a year.  I  referred all of his care to you.  Actually, he has recovered pretty nicely.  Should he show signs of deterioration in his LVEF, I would invite revisiting.  As it is, he is taking good care of himself.      We did discuss diet, weight loss and exercise at length.      I discussed hypertension in the context of a previous history of LV dysfunction and generally a blood pressure below 130/80 would be ideal.      I made no changes.  I will see him on an as-needed basis.  He is a really sweet man.  I hope he can lose some weight, but other than that, I think his prognosis is good.      Warm regards to you.  If you have any questions, concerns or disagreements, give me a call.      cc:   Abiel Negron MD    [facility]    [address] [place] [address]  [id number]         TEN RODRÍGUEZ MD, [facility]             D: 2018   T: 2018   MT: NEELAM      Name:     SHERRELL ZAMAN   MRN:      [id number]        Account:      [id number]   :      1945           Service Date: 2018      Document: [id number]         Thank you for allowing me to participate in the care of your patient.      Sincerely,     TEN RODRÍGUEZ MD     [facility]    cc:   Ten Rodríguez MD  [address] [address]

## 2018-06-04 NOTE — PROGRESS NOTES
I saw Gregory George today.  Gregory has had a chronic mild to moderate to nonischemic cardiomyopathy with ejection fractions in the 40%-45% range.  He generally takes meticulous care of himself.  He watches what he eats, and his weight has been relatively stable.  He has a history of hypertension that has been well-controlled and he relates that he recently added spironolactone to his program because of some mild hypertension.   VITAL SIGNS:  His blood pressure is 120/70, heart rate 70, he weighed 244 pounds.   NECK:  He had no neck vein distention or bruits.   HEART:  Regular without gallop or murmur.   LUNGS:  Clear.   ABDOMEN:  Soft, firm, obese, nontender.   EXTREMITIES:  Free of edema.       His current program is:   1.  Carvedilol 25 mg b.i.d.   2.  Lisinopril/hydrochlorothiazide 20/25 mg a day.   3.  Spironolactone 25 mg daily.   4.  Aspirin 81 mg a day.   5.  Omeprazole 20 mg a day.     6.  He is also taking some p.r.n. vitamins, glucosamine, etc.     HPI and Plan:   See dictation    Echo done 3/2017:  The ascending aorta is Mildly dilated.  The right ventricle is mild to moderately dilated.  Left ventricular systolic function is normal.  Grade I or early diastolic dysfunction.  The study was technically difficult. Contrast could not be used due to  inability to obtain IV access.  _____________________________________________________________________________  __        Left Ventricle  The left ventricle is normal in size. There is normal left ventricular wall  thickness. Left ventricular systolic function is normal. The visual ejection  fraction is estimated at 55-60%. Grade I or early diastolic dysfunction. E by  E prime ratio is less than 8, that likely suggests normal left ventricular  filling pressures. Regional wall motion abnormalities cannot be excluded due  to limited visualization. No consistent gross regional wall motion  abnormalities are seen but image quality is suboptimal and sensitivity  is  reduced.     Right Ventricle  The right ventricle is mild to moderately dilated. The right ventricle is not  well visualized. The right ventricular systolic function is normal.         No orders of the defined types were placed in this encounter.    No orders of the defined types were placed in this encounter.    There are no discontinued medications.      Encounter Diagnoses   Name Primary?     Idiopathic cardiomyopathy (H)      Hyperlipidemia with target LDL less than 130      BENIGN HYPERTENSION        CURRENT MEDICATIONS:  Current Outpatient Prescriptions   Medication Sig Dispense Refill     ASPIRIN 81 MG OR TABS take one tablet daily with food 0 0     carvedilol (COREG) 25 MG tablet Take 1 tablet (25 mg) by mouth 2 times daily (with meals) 180 tablet 1     GLUCOSAMINE 500 MG OR CAPS 1 tablet twice daily 60 5     lisinopril-hydrochlorothiazide (PRINZIDE/ZESTORETIC) 20-25 MG per tablet Take 1 tablet by mouth daily 90 tablet 3     OMEPRAZOLE 20 MG PO CPDR take 30'-60' before 1st meal daily 93 Cap 3     ORDER FOR DME Jobst stockings 4 5     simvastatin (ZOCOR) 40 MG tablet Take 1 tablet (40 mg) by mouth At Bedtime 90 tablet 3     spironolactone (ALDACTONE) 25 MG tablet Take 1 tablet (25 mg) by mouth daily 90 tablet 3     triamcinolone (KENALOG) 0.1 % ointment Apply to AA BID x 1-2 weeks then  g 3     RITO-MIN TABS   OR 1 TABLET DAILY 30 5       ALLERGIES     Allergies   Allergen Reactions     No Known Allergies        PAST MEDICAL HISTORY:  Past Medical History:   Diagnosis Date     CHF (congestive heart failure) (H)     EF 40-45%. now 55%     Eczema      Esophageal reflux      Essential hypertension, benign      Hyperlipidemia LDL goal < 130      Idiopathic cardiomyopathy (H)      Ulcerative (chronic) proctitis (H)      Varicose veins of leg        PAST SURGICAL HISTORY:  Past Surgical History:   Procedure Laterality Date     ENDOSCOPIC STRIPPING VEIN(S)       HC REPAIR ROTATOR CUFF,ACUTE  8/10/05     right       FAMILY HISTORY:  Family History   Problem Relation Age of Onset     HEART DISEASE Mother 62     d:age 62 MI     HEART DISEASE Father 86     D:86 chf     CANCER Sister      d:age 58 liver     Family History Negative Sister      b:1934     Hypertension Sister      b:1942     CEREBROVASCULAR DISEASE Brother           DIABETES Brother      Dementia Brother      Prostate Cancer Brother 65     Lung Cancer Brother      CANCER Son 35     brain cancer - surgery with seizures     C.A.D. Son 36     tobacco       SOCIAL HISTORY:  Social History     Social History     Marital status:      Spouse name: Elizabeth     Number of children: 4     Years of education: 14     Occupational History     tool       [employer]     Social History Main Topics     Smoking status: Former Smoker     Packs/day: 0.50     Years: 14.00     Types: Cigarettes     Start date: 1/1/1961     Quit date: 1/1/1975     Smokeless tobacco: Never Used     Alcohol use 0.6 oz/week     1 Standard drinks or equivalent per week      Comment: occasionally     Drug use: No     Sexual activity: Yes     Partners: Female     Other Topics Concern     Caffeine Concern Yes     4-5 cups daily     Sleep Concern No     Stress Concern No     Weight Concern Yes     watches it     Special Diet Yes     low sodium     Exercise Yes     walking at work     Social History Narrative         Review of Systems:  Skin:  Positive for scaling;itching dry skin -  uses a lotion for it    Eyes:  Positive for glasses    ENT:  Positive for hearing loss    Respiratory:  Positive for dyspnea on exertion;cough occas dry cough ,  snores    Cardiovascular:  Negative      Gastroenterology: Positive for reflux reflux under control   Genitourinary:  Negative      Musculoskeletal:  Positive for joint pain;joint stiffness;joint swelling;nocturnal cramping both knees,  ocas night cramps   Neurologic:  Negative      Psychiatric:  Positive for excessive stress son is ill  "with Brain tumor   Heme/Lymph/Imm:  Negative      Endocrine:  Negative        Physical Exam:  Vitals: /80 (BP Location: Right arm, Patient Position: Sitting, Cuff Size: Adult Large)  Pulse 66  Ht 1.778 m (5' 10\")  Wt 115 kg (253 lb 8 oz)  BMI 36.37 kg/m2    Constitutional:           Skin:             Head:           Eyes:           Lymph:      ENT:           Neck:           Respiratory:            Cardiac:                                                           GI:           Extremities and Muscular Skeletal:                 Neurological:           Psych:           Recent Lab Results:  LIPID RESULTS:  Lab Results   Component Value Date    CHOL 123 03/20/2018    HDL 33 (L) 03/20/2018    LDL 57 03/20/2018    TRIG 165 (H) 03/20/2018    CHOLHDLRATIO 2.6 02/13/2015       LIVER ENZYME RESULTS:  Lab Results   Component Value Date    AST 38 05/10/2011    ALT 34 03/20/2018       CBC RESULTS:  Lab Results   Component Value Date    WBC 7.4 08/17/2017    RBC 5.14 08/17/2017    HGB 14.9 08/17/2017    HCT 46.1 08/17/2017    MCV 90 08/17/2017    MCH 29.0 08/17/2017    MCHC 32.3 08/17/2017    RDW 13.4 08/17/2017     08/17/2017       BMP RESULTS:  Lab Results   Component Value Date     03/20/2018    POTASSIUM 3.8 03/20/2018    CHLORIDE 106 03/20/2018    CO2 26 03/20/2018    ANIONGAP 7 03/20/2018     (H) 03/20/2018    BUN 17 03/20/2018    CR 0.92 03/20/2018    GFRESTIMATED 81 03/20/2018    GFRESTBLACK >90 03/20/2018    SAMIR 8.4 (L) 03/20/2018        A1C RESULTS:  No results found for: A1C    INR RESULTS:  No results found for: INR        CC  Mani Rodríguez MD  [address]  [place] [id number]                  "

## 2018-06-04 NOTE — LETTER
2018      Abiel Negron MD  [address] [facility] [address]      RE: Ej CANTRELL Doris       Dear Colleague,    I had the pleasure of seeing Ej Garciadenis in the [facility].    Service Date: 2018      HISTORY OF PRESENT ILLNESS:  I saw Gregory George today, who is 72.  He continues to work full-time.  He showed last year a nice recovery of what I would consider a nonischemic cardiomyopathy.  His EF was in the 40% range.  With medication therapy, his EF recovered completely.  In the past year, he has had no change in his breathing.  He denies chest pain, palpitations, shortness of breath, orthopnea or lower leg edema.      His program is rather typical includin.  Carvedilol 25 mg b.i.d.   2.  Simvastatin 40 mg at bedtime.   3.  Aldactone 25 mg a day.   4.  Lisinopril/hydrochlorothiazide 20/25 mg daily.      On this program, he has been essentially asymptomatic.  His blood pressures have been normal.      He has tended to be a little on the overweight side at 253 pounds.  Other than that, he has been pretty healthy.  On the statin therapy his LDL has been 57, triglycerides 165, which is actually the first time they have been increased, that supports some lack of calorie control.  Creatinine 0.9, BUN 17.      His LVEF by echo last year was 55%-60%.  There was some diastolic dysfunction of the left ventricle.  The right ventricle showed normal systolic function but possibly mildly dilated.  He did not show signs of right heart failure otherwise.      PHYSICAL EXAMINATION:   VITAL SIGNS:  Blood pressure 130/80, heart rate 66 beats a minute and regular.     NECK:  He had no neck vein distention or bruits at 30 degrees.   HEART:  Heart tones are regular and distant without gallop or murmur.   LUNGS:  Clear.   ABDOMEN:  Soft without organomegaly.   EXTREMITIES:  Free of edema with +1 pedal pulses.      Accordingly, I reviewed each of the medicines.  I  reviewed my pattern that in situations like this and in the context of hypertension, I tend to continue the ACE, beta blocker, statin program.  I made no changes.  I did not order any tests and did not order any studies currently.  I did ask him to get an echo in a year.  I referred all of his care to you.  Actually, he has recovered pretty nicely.  Should he show signs of deterioration in his LVEF, I would invite revisiting.  As it is, he is taking good care of himself.      We did discuss diet, weight loss and exercise at length.      I discussed hypertension in the context of a previous history of LV dysfunction and generally a blood pressure below 130/80 would be ideal.      I made no changes.  I will see him on an as-needed basis.  He is a really sweet man.  I hope he can lose some weight, but other than that, I think his prognosis is good.      Warm regards to you.  If you have any questions, concerns or disagreements, give me a call.      cc:   Abiel Negron MD    [address]  [address]         TEN COLBERT MD, [facility]             D: 2018   T: 2018   MT: NEELAM      Name:     SHERRELL ZAMAN   MRN:      [id number]        Account:      [id number]   :      1945           Service Date: 2018      Document: [id number]         Outpatient Encounter Prescriptions as of 2018   Medication Sig Dispense Refill     ASPIRIN 81 MG OR TABS take one tablet daily with food 0 0     carvedilol (COREG) 25 MG tablet Take 1 tablet (25 mg) by mouth 2 times daily (with meals) 180 tablet 3     GLUCOSAMINE 500 MG OR CAPS 1 tablet twice daily 60 5     lisinopril-hydrochlorothiazide (PRINZIDE/ZESTORETIC) 20-25 MG per tablet Take 1 tablet by mouth daily 90 tablet 3     OMEPRAZOLE 20 MG PO CPDR take 30'-60' before 1st meal daily 93 Cap 3     ORDER FOR DME Jobst stockings 4 5     simvastatin (ZOCOR) 40 MG tablet Take 1 tablet (40 mg) by mouth At Bedtime 90  tablet 3     spironolactone (ALDACTONE) 25 MG tablet Take 1 tablet (25 mg) by mouth daily 90 tablet 3     triamcinolone (KENALOG) 0.1 % ointment Apply to AA BID x 1-2 weeks then  g 3     RITO-MIN TABS   OR 1 TABLET DAILY 30 5     [DISCONTINUED] carvedilol (COREG) 25 MG tablet Take 1 tablet (25 mg) by mouth 2 times daily (with meals) 180 tablet 1     [DISCONTINUED] lisinopril-hydrochlorothiazide (PRINZIDE/ZESTORETIC) 20-25 MG per tablet Take 1 tablet by mouth daily 90 tablet 3     [DISCONTINUED] simvastatin (ZOCOR) 40 MG tablet Take 1 tablet (40 mg) by mouth At Bedtime 90 tablet 3     [DISCONTINUED] spironolactone (ALDACTONE) 25 MG tablet Take 1 tablet (25 mg) by mouth daily 90 tablet 3     No facility-administered encounter medications on file as of 6/4/2018.        Again, thank you for allowing me to participate in the care of your patient.      Sincerely,    TEN COLBERT MD     [facility]

## 2018-06-05 NOTE — PROGRESS NOTES
Service Date: 2018      HISTORY OF PRESENT ILLNESS:  I saw Gregory George today, who is 72.  He continues to work full-time.  He showed last year a nice recovery of what I would consider a nonischemic cardiomyopathy.  His EF was in the 40% range.  With medication therapy, his EF recovered completely.  In the past year, he has had no change in his breathing.  He denies chest pain, palpitations, shortness of breath, orthopnea or lower leg edema.      His program is rather typical includin.  Carvedilol 25 mg b.i.d.   2.  Simvastatin 40 mg at bedtime.   3.  Aldactone 25 mg a day.   4.  Lisinopril/hydrochlorothiazide 20/25 mg daily.      On this program, he has been essentially asymptomatic.  His blood pressures have been normal.      He has tended to be a little on the overweight side at 253 pounds.  Other than that, he has been pretty healthy.  On the statin therapy his LDL has been 57, triglycerides 165, which is actually the first time they have been increased, that supports some lack of calorie control.  Creatinine 0.9, BUN 17.      His LVEF by echo last year was 55%-60%.  There was some diastolic dysfunction of the left ventricle.  The right ventricle showed normal systolic function but possibly mildly dilated.  He did not show signs of right heart failure otherwise.      PHYSICAL EXAMINATION:   VITAL SIGNS:  Blood pressure 130/80, heart rate 66 beats a minute and regular.     NECK:  He had no neck vein distention or bruits at 30 degrees.   HEART:  Heart tones are regular and distant without gallop or murmur.   LUNGS:  Clear.   ABDOMEN:  Soft without organomegaly.   EXTREMITIES:  Free of edema with +1 pedal pulses.      Accordingly, I reviewed each of the medicines.  I reviewed my pattern that in situations like this and in the context of hypertension, I tend to continue the ACE, beta blocker, statin program.  I made no changes.  I did not order any tests and did not order any studies currently.  I did  ask him to get an echo in a year.  I referred all of his care to you.  Actually, he has recovered pretty nicely.  Should he show signs of deterioration in his LVEF, I would invite revisiting.  As it is, he is taking good care of himself.      We did discuss diet, weight loss and exercise at length.      I discussed hypertension in the context of a previous history of LV dysfunction and generally a blood pressure below 130/80 would be ideal.      I made no changes.  I will see him on an as-needed basis.  He is a really sweet man.  I hope he can lose some weight, but other than that, I think his prognosis is good.      Warm regards to you.  If you have any questions, concerns or disagreements, give me a call.      cc:   Abiel Negron MD    [address]  [address]         TEN COLBERT MD, [facility]             D: 2018   T: 2018   MT: NEELAM      Name:     SHERRELL ZAMAN   MRN:      [id number]        Account:      [id number]   :      1945           Service Date: 2018      Document: [id number]

## 2018-07-24 ENCOUNTER — OFFICE VISIT (OUTPATIENT)
Dept: INTERNAL MEDICINE | Facility: CLINIC | Age: 73
End: 2018-07-24
Payer: COMMERCIAL

## 2018-07-24 VITALS
DIASTOLIC BLOOD PRESSURE: 82 MMHG | WEIGHT: 239.5 LBS | HEART RATE: 66 BPM | RESPIRATION RATE: 16 BRPM | BODY MASS INDEX: 34.29 KG/M2 | TEMPERATURE: 98 F | HEIGHT: 70 IN | SYSTOLIC BLOOD PRESSURE: 122 MMHG

## 2018-07-24 DIAGNOSIS — I83.891 BLEEDING FROM VARICOSE VEINS OF RIGHT LOWER EXTREMITY: ICD-10-CM

## 2018-07-24 DIAGNOSIS — I83.93 VARICOSE VEINS OF BOTH LOWER EXTREMITIES: Primary | ICD-10-CM

## 2018-07-24 PROCEDURE — 99213 OFFICE O/P EST LOW 20 MIN: CPT | Performed by: PHYSICIAN ASSISTANT

## 2018-07-24 ASSESSMENT — PAIN SCALES - GENERAL: PAINLEVEL: NO PAIN (0)

## 2018-07-24 NOTE — MR AVS SNAPSHOT
After Visit Summary   7/24/2018    Ej George    MRN: [id number]           Patient Information     Date Of Birth          1945        Visit Information        Provider Department      7/24/2018 1:00 PM Marsha Avendano PA-C [facility]        Today's Diagnoses     Varicose veins of both lower extremities    -  1    Bleeding from varicose veins of right lower extremity          Care Instructions    Monitor  If recurrent then refer to vascular surgeon-           Follow-ups after your visit        Who to contact     If you have questions or need follow up information about today's clinic visit or your schedule please contact [facility] directly at [phone].  Normal or non-critical lab and imaging results will be communicated to you by [employer], letter or phone within 4 business days after the clinic has received the results. If you do not hear from us within 7 days, please contact the clinic through [employer] or phone. If you have a critical or abnormal lab result, we will notify you by phone as soon as possible.  Submit refill requests through [employer] or call your pharmacy and they will forward the refill request to us. Please allow 3 business days for your refill to be completed.          Additional Information About Your Visit        MyChart Information     [employer]ually gives you secure access to your electronic health record. If you see a primary care provider, you can also send messages to your care team and make appointments. If you have questions, please call your primary care clinic.  If you do not have a primary care provider, please call [phone] and they will assist you.        Care EveryWhere ID     This is your Care EveryWhere ID. This could be used by other organizations to access your [place] medical records  [id number]        Your Vitals Were     Pulse Temperature Respirations Height BMI (Body Mass Index)       66 98  F  "(36.7  C) (Oral) 16 5' 10\" (1.778 m) 34.36 kg/m2        Blood Pressure from Last 3 Encounters:   07/24/18 122/82   06/04/18 132/80   04/06/18 120/82    Weight from Last 3 Encounters:   07/24/18 239 lb 8 oz (108.6 kg)   06/04/18 253 lb 8 oz (115 kg)   04/06/18 252 lb 11.2 oz (114.6 kg)              Today, you had the following     No orders found for display       Primary Care Provider Office Phone # Fax #    Abiel Negron [phone]       [address] [place] [phone]        Equal Access to Services     ROBBIE MEDINA : Hadii kunal gomeso Soisai, waaxda luqadaha, qaybta kaalmada adeegyada, yousif schuler. So [facility] [phone].    ATENCIÓN: Si habla español, tiene a vieira disposición servicios gratuitos de asistencia lingüística. [facility] [phone].    We comply with applicable federal civil rights laws and [place] laws. We do not discriminate on the basis of race, color, national origin, age, disability, sex, sexual orientation, or gender identity.            Thank you!     Thank you for choosing [facility]  for your care. Our goal is always to provide you with excellent care. Hearing back from our patients is one way we can continue to improve our services. Please take a few minutes to complete the written survey that you may receive in the mail after your visit with us. Thank you!             Your Updated Medication List - Protect others around you: Learn how to safely use, store and throw away your medicines at www.disposemymeds.org.          This list is accurate as of 7/24/18  1:45 PM.  Always use your most recent med list.                   Brand Name Dispense Instructions for use Diagnosis    aspirin 81 MG tablet     0    take one tablet daily with food        carvedilol 25 MG tablet    COREG    180 tablet    Take 1 tablet (25 mg) by mouth 2 times daily (with meals)    Idiopathic cardiomyopathy (H), Essential " hypertension, benign       glucosamine 500 MG Caps     60    1 tablet twice daily        lisinopril-hydrochlorothiazide 20-25 MG per tablet    PRINZIDE/ZESTORETIC    90 tablet    Take 1 tablet by mouth daily    Essential hypertension, benign       omeprazole 20 MG CR capsule    priLOSEC    93 Cap    take 30'-60' before 1st meal daily    Esophageal reflux       order for DME     4    Jobst stockings    Unspecified venous (peripheral) insufficiency       simvastatin 40 MG tablet    ZOCOR    90 tablet    Take 1 tablet (40 mg) by mouth At Bedtime    Hyperlipidemia with target LDL less than 130       spironolactone 25 MG tablet    ALDACTONE    90 tablet    Take 1 tablet (25 mg) by mouth daily    Idiopathic cardiomyopathy (H), Essential hypertension, benign       triamcinolone 0.1 % ointment    KENALOG    454 g    Apply to AA BID x 1-2 weeks then PRN    Chronic dermatitis of hands       RITO-MIN TABS   OR     30    1 TABLET DAILY

## 2018-07-24 NOTE — PROGRESS NOTES
"  SUBJECTIVE:   Ej George is a 72 year old male who presents to clinic today for the following health issues:      Pt is here today because last night while he was in the shower he had noticed blood coming from his lower leg. He wasn't sure exactly where it was coming from. He looked down and happened to be from a varicose vein that had opened slightly. He states that it is not painful. He wrapped it up and kept a bandage on it all night last night until coming to his doctor appointment today.       PMH of cardiomyopathy. Seen by cardiology last month and stable  Hx of varicose vein surgery on the left leg several years ago   -------------------------------------    Problem list and histories reviewed & adjusted, as indicated.  Additional history: as documented    Labs reviewed in EPIC    Reviewed and updated as needed this visit by clinical staff  Tobacco  Allergies  Meds       Reviewed and updated as needed this visit by Provider  Allergies  Meds         ROS:  Constitutional, derm, cardiovascular, pulmonary, gi and gu systems are negative, except as otherwise noted.    OBJECTIVE:     /82 (BP Location: Left arm, Patient Position: Chair, Cuff Size: Adult Regular)  Pulse 66  Temp 98  F (36.7  C) (Oral)  Resp 16  Ht 5' 10\" (1.778 m)  Wt 239 lb 8 oz (108.6 kg)  BMI 34.36 kg/m2  Body mass index is 34.36 kg/(m^2).  GENERAL: healthy, alert and no distress  RESP: lungs clear to auscultation - no rales, rhonchi or wheezes  CV: regular rates and rhythm and normal S1 S2, no S3 or S4  SKIN: varicosities - lower legs and right lower leg worse   Area in question is a more superficial cluster of small varicosities on the inner leg medial side of the knee no bleeding, no redness     Diagnostic Test Results:  none     ASSESSMENT/PLAN:             1. Varicose veins of both lower extremities      2. Bleeding from varicose veins of right lower extremity  Reassurance given. No bleeding now   Monitor if " recurrent issues then would refer to vascular group for more treatment.             Marsha Avendano PA-C  [facility]

## 2018-12-03 ENCOUNTER — MYC REFILL (OUTPATIENT)
Dept: DERMATOLOGY | Facility: CLINIC | Age: 73
End: 2018-12-03

## 2018-12-03 DIAGNOSIS — L30.9 CHRONIC DERMATITIS OF HANDS: ICD-10-CM

## 2018-12-03 RX ORDER — TRIAMCINOLONE ACETONIDE 1 MG/G
OINTMENT TOPICAL
Qty: 454 G | Refills: 0 | Status: SHIPPED | OUTPATIENT
Start: 2018-12-03

## 2018-12-03 NOTE — TELEPHONE ENCOUNTER
Message from MyChart:  Original authorizing provider: ELIANE Sanchez would like a refill of the following medications:  triamcinolone (KENALOG) 0.1 % ointment [Molly Currie PA-C]    Preferred pharmacy: [address]    Comment:

## 2018-12-03 NOTE — TELEPHONE ENCOUNTER
Called and spoke with patient. Informed patient I can give him 1 refill, but needs to see provider for further refills. Scheduled future f/u appointment with provider. Verified pharmacy, patient voiced understanding.    Danny RN-BSN  [facility]  [phone]

## 2019-01-08 ENCOUNTER — OFFICE VISIT (OUTPATIENT)
Dept: DERMATOLOGY | Facility: CLINIC | Age: 74
End: 2019-01-08
Payer: COMMERCIAL

## 2019-01-08 VITALS — SYSTOLIC BLOOD PRESSURE: 117 MMHG | DIASTOLIC BLOOD PRESSURE: 74 MMHG | HEART RATE: 67 BPM | OXYGEN SATURATION: 95 %

## 2019-01-08 DIAGNOSIS — L30.1 DYSHIDROTIC ECZEMA: Primary | ICD-10-CM

## 2019-01-08 PROCEDURE — 99212 OFFICE O/P EST SF 10 MIN: CPT | Performed by: PHYSICIAN ASSISTANT

## 2019-01-08 NOTE — LETTER
1/8/2019         RE: Ej George  [address]  [place] [address]        Dear Colleague,    Thank you for referring your patient, Ej George, to the [facility]. Please see a copy of my visit note below.    HPI:   Ej George is a 73 year old male who presents for follow up of dyshidrotic eczema on hands and on eczema on the left lower leg. Applying betamethasone PRN. NBUVB treatment worked well.   chief complaint  Location: hands, left lower leg   Condition present for:  On and off for years.   Previous treatments include: topical steroids - help a little, but then flares    Review Of Systems  Eyes: negative  Ears/Nose/Throat: negative  Respiratory: No shortness of breath, dyspnea on exertion, cough, or hemoptysis  Cardiovascular: negative  Gastrointestinal: negative  Genitourinary: negative  Musculoskeletal: negative  Neurologic: negative  Psychiatric: negative    This document serves as a record of the services and decisions personally performed and made by Molly Solano, MS, PA-C. It was created on her behalf by Cristin Ray, a trained medical scribe. The creation of this document is based on the provider's statements to the medical scribe.  Cristin Ray 2:40 PM January 8, 2019    PHYSICAL EXAM:   A&Ox3:Yes    Well developed, well nourished male Yes   Skin Type: 2  /74   Pulse 67   SpO2 95%     1. Skin clear      ASSESSMENT/PLAN:   1. Dyshidrotic eczema on bilateral hands - Doing well with betamethasone PRN and thick emollients. He will re-start NBUVB if flares again.   --Continue Betamethasone ointment BID PRN  --Continue emollients daily      Follow-up: yearly if doing well/PRN flaring  CC:   Scribed By: Cristin Ray, Medical Scribe    The information in this document, created by the medical scribe for me, accurately reflects the services I personally performed and the decisions made by me. I have  reviewed and approved this document for accuracy prior to leaving the patient care area.  January 8, 2019 2:45 PM    Molly Solano MS, PABerthaC        Again, thank you for allowing me to participate in the care of your patient.        Sincerely,        Molly Solano PA-C

## 2019-01-08 NOTE — PROGRESS NOTES
HPI:   Ej George is a 73 year old male who presents for follow up of dyshidrotic eczema on hands and on eczema on the left lower leg. Applying betamethasone PRN. NBUVB treatment worked well.   chief complaint  Location: hands, left lower leg   Condition present for:  On and off for years.   Previous treatments include: topical steroids - help a little, but then flares    Review Of Systems  Eyes: negative  Ears/Nose/Throat: negative  Respiratory: No shortness of breath, dyspnea on exertion, cough, or hemoptysis  Cardiovascular: negative  Gastrointestinal: negative  Genitourinary: negative  Musculoskeletal: negative  Neurologic: negative  Psychiatric: negative    This document serves as a record of the services and decisions personally performed and made by Molly Solano MS, PABerthaC. It was created on her behalf by Cristin Ray, a trained medical scribe. The creation of this document is based on the provider's statements to the medical scribe.  Cristin Ray 2:40 PM January 8, 2019    PHYSICAL EXAM:   A&Ox3:Yes    Well developed, well nourished male Yes   Skin Type: 2  /74   Pulse 67   SpO2 95%     1. Skin clear      ASSESSMENT/PLAN:   1. Dyshidrotic eczema on bilateral hands - Doing well with betamethasone PRN and thick emollients. He will re-start NBUVB if flares again.   --Continue Betamethasone ointment BID PRN  --Continue emollients daily      Follow-up: yearly if doing well/PRN flaring  CC:   Scribed By: Cristin Ray, Medical Scribe    The information in this document, created by the medical scribe for me, accurately reflects the services I personally performed and the decisions made by me. I have reviewed and approved this document for accuracy prior to leaving the patient care area.  January 8, 2019 2:45 PM    Molly Solano MS, PA-C

## 2019-01-15 DIAGNOSIS — I10 ESSENTIAL HYPERTENSION, BENIGN: ICD-10-CM

## 2019-01-15 DIAGNOSIS — I42.9 IDIOPATHIC CARDIOMYOPATHY (H): ICD-10-CM

## 2019-01-15 DIAGNOSIS — E78.5 HYPERLIPIDEMIA WITH TARGET LDL LESS THAN 130: ICD-10-CM

## 2019-01-15 RX ORDER — SPIRONOLACTONE 25 MG/1
25 TABLET ORAL DAILY
Qty: 90 TABLET | Refills: 0 | Status: SHIPPED | OUTPATIENT
Start: 2019-01-15 | End: 2019-04-26

## 2019-01-15 RX ORDER — CARVEDILOL 25 MG/1
25 TABLET ORAL 2 TIMES DAILY WITH MEALS
Qty: 180 TABLET | Refills: 0 | Status: SHIPPED | OUTPATIENT
Start: 2019-01-15 | End: 2019-04-14

## 2019-01-15 RX ORDER — LISINOPRIL AND HYDROCHLOROTHIAZIDE 20; 25 MG/1; MG/1
1 TABLET ORAL DAILY
Qty: 90 TABLET | Refills: 0 | Status: SHIPPED | OUTPATIENT
Start: 2019-01-15 | End: 2019-04-26

## 2019-01-15 RX ORDER — SIMVASTATIN 40 MG
40 TABLET ORAL AT BEDTIME
Qty: 90 TABLET | Refills: 0 | Status: SHIPPED | OUTPATIENT
Start: 2019-01-15 | End: 2019-04-26

## 2019-01-15 NOTE — TELEPHONE ENCOUNTER
Requested Prescriptions   Pending Prescriptions Disp Refills     spironolactone (ALDACTONE) 25 MG tablet 90 tablet 3     Sig: Take 1 tablet (25 mg) by mouth daily    There is no refill protocol information for this order   Last Written Prescription Date:  6/4/2018  Last Fill Quantity: 90,  # refills: 3   Last office visit: 7/24/2018 with prescribing provider:  7/24/2018   Future Office Visit:         carvedilol (COREG) 25 MG tablet 180 tablet 3     Sig: Take 1 tablet (25 mg) by mouth 2 times daily (with meals)    There is no refill protocol information for this order   Last Written Prescription Date:  6/4/2018  Last Fill Quantity: 180,  # refills: 3   Last office visit: 7/24/2018 with prescribing provider:  7/24/2018   Future Office Visit:         lisinopril-hydrochlorothiazide (PRINZIDE/ZESTORETIC) 20-25 MG tablet 90 tablet 3     Sig: Take 1 tablet by mouth daily    There is no refill protocol information for this order     Last Written Prescription Date:  6/4/2018  Last Fill Quantity: 90,  # refills: 3   Last office visit: 7/24/2018 with prescribing provider:  7/24/2018   Future Office Visit:         simvastatin (ZOCOR) 40 MG tablet 90 tablet 3     Sig: Take 1 tablet (40 mg) by mouth At Bedtime    There is no refill protocol information for this order     Last Written Prescription Date:  6/4/2018  Last Fill Quantity: 90,  # refills: 3   Last office visit: 7/24/2018 with prescribing provider:  7/24/2018   Future Office Visit:

## 2019-01-15 NOTE — TELEPHONE ENCOUNTER
.Prescription approved per [facility] Refill Protocol.

## 2019-02-14 ENCOUNTER — OFFICE VISIT (OUTPATIENT)
Dept: INTERNAL MEDICINE | Facility: CLINIC | Age: 74
End: 2019-02-14
Payer: COMMERCIAL

## 2019-02-14 ENCOUNTER — ANCILLARY PROCEDURE (OUTPATIENT)
Dept: GENERAL RADIOLOGY | Facility: CLINIC | Age: 74
End: 2019-02-14
Attending: INTERNAL MEDICINE
Payer: COMMERCIAL

## 2019-02-14 VITALS
WEIGHT: 229.8 LBS | DIASTOLIC BLOOD PRESSURE: 92 MMHG | BODY MASS INDEX: 32.9 KG/M2 | TEMPERATURE: 97.9 F | OXYGEN SATURATION: 96 % | RESPIRATION RATE: 12 BRPM | HEART RATE: 65 BPM | SYSTOLIC BLOOD PRESSURE: 134 MMHG | HEIGHT: 70 IN

## 2019-02-14 DIAGNOSIS — M13.131: ICD-10-CM

## 2019-02-14 DIAGNOSIS — M25.531 RIGHT WRIST PAIN: ICD-10-CM

## 2019-02-14 DIAGNOSIS — M25.531 RIGHT WRIST PAIN: Primary | ICD-10-CM

## 2019-02-14 LAB
ANION GAP SERPL CALCULATED.3IONS-SCNC: 5 MMOL/L (ref 3–14)
BUN SERPL-MCNC: 21 MG/DL (ref 7–30)
CALCIUM SERPL-MCNC: 9.1 MG/DL (ref 8.5–10.1)
CHLORIDE SERPL-SCNC: 101 MMOL/L (ref 94–109)
CO2 SERPL-SCNC: 29 MMOL/L (ref 20–32)
CREAT SERPL-MCNC: 0.95 MG/DL (ref 0.66–1.25)
GFR SERPL CREATININE-BSD FRML MDRD: 78 ML/MIN/{1.73_M2}
GLUCOSE SERPL-MCNC: 97 MG/DL (ref 70–99)
POTASSIUM SERPL-SCNC: 4 MMOL/L (ref 3.4–5.3)
SODIUM SERPL-SCNC: 135 MMOL/L (ref 133–144)
URATE SERPL-MCNC: 3.4 MG/DL (ref 3.5–7.2)

## 2019-02-14 PROCEDURE — 84550 ASSAY OF BLOOD/URIC ACID: CPT | Performed by: INTERNAL MEDICINE

## 2019-02-14 PROCEDURE — 80048 BASIC METABOLIC PNL TOTAL CA: CPT | Performed by: INTERNAL MEDICINE

## 2019-02-14 PROCEDURE — 99214 OFFICE O/P EST MOD 30 MIN: CPT | Performed by: INTERNAL MEDICINE

## 2019-02-14 PROCEDURE — 73110 X-RAY EXAM OF WRIST: CPT | Mod: RT

## 2019-02-14 PROCEDURE — 36415 COLL VENOUS BLD VENIPUNCTURE: CPT | Performed by: INTERNAL MEDICINE

## 2019-02-14 RX ORDER — INDOMETHACIN 50 MG/1
50 CAPSULE ORAL 2 TIMES DAILY WITH MEALS
Qty: 14 CAPSULE | Refills: 0 | Status: SHIPPED | OUTPATIENT
Start: 2019-02-14 | End: 2019-03-15

## 2019-02-14 ASSESSMENT — MIFFLIN-ST. JEOR: SCORE: 1793.62

## 2019-02-14 NOTE — PATIENT INSTRUCTIONS
Ice several times per day  Use the indomethacin twice daily. Don't use any other anti-inflammatories    [facility]  [phone]   [email or web address]

## 2019-02-14 NOTE — PROGRESS NOTES
"  SUBJECTIVE:   Ej George is a 73 year old male who presents to clinic today for the following health issues:      Joint Pain    Onset: 2 weeks     Description:   Location: right wrist  Character: Sharp and Dull ache    Intensity: mild    Progression of Symptoms: worse    Accompanying Signs & Symptoms:  Other symptoms: warmth and swelling    History:   Previous similar pain: no       Precipitating factors:   Trauma or overuse: no     Alleviating factors:  Improved by: ice    Therapies Tried and outcome: Ice, naproxen a little helpful    Problem list and histories reviewed & adjusted, as indicated.  Additional history: as documented    Labs reviewed in EPIC    Reviewed and updated as needed this visit by clinical staff  Allergies  Meds       Reviewed and updated as needed this visit by Provider         ROS:  Constitutional, HEENT, cardiovascular, pulmonary, gi and gu systems are negative, except as otherwise noted.    OBJECTIVE:                                                    BP (!) 134/92   Pulse 65   Temp 97.9  F (36.6  C) (Oral)   Resp 12   Ht 1.778 m (5' 10\")   Wt 104.2 kg (229 lb 12.8 oz)   SpO2 96%   BMI 32.97 kg/m    Body mass index is 32.97 kg/m .  GENERAL APPEARANCE: alert and no distress  MS: Right wrist is swollen with some extension of swelling into the dorsal aspect of the hand there is an area which appears similar to 6 ganglion cyst along the wrist.  The wrist is tender warm.. Left wrist is normal range of motion and on palpation    Diagnostic test results:  Results for orders placed or performed in visit on 02/14/19 (from the past 24 hour(s))   Uric acid   Result Value Ref Range    Uric Acid 3.4 (L) 3.5 - 7.2 mg/dL   Basic metabolic panel   Result Value Ref Range    Sodium 135 133 - 144 mmol/L    Potassium 4.0 3.4 - 5.3 mmol/L    Chloride 101 94 - 109 mmol/L    Carbon Dioxide 29 20 - 32 mmol/L    Anion Gap 5 3 - 14 mmol/L    Glucose 97 70 - 99 mg/dL    Urea Nitrogen 21 7 - 30 mg/dL "    Creatinine 0.95 0.66 - 1.25 mg/dL    GFR Estimate 78 >60 mL/min/[1.73_m2]    GFR Estimate If Black >90 >60 mL/min/[1.73_m2]    Calcium 9.1 8.5 - 10.1 mg/dL        ASSESSMENT/PLAN:                                                    1. Right wrist pain  2. Inflammatory monoarthritis of wrist, right  Acute synovitis from appearance  NSAIDs ice to get better control symptoms.  If continue see orthopedics initially  - indomethacin (INDOCIN) 50 MG capsule; Take 1 capsule (50 mg) by mouth 2 times daily (with meals) for 7 days  Dispense: 14 capsule; Refill: 0  - Uric acid  - Basic metabolic panel    Abiel Negron MD  [facility]

## 2019-02-22 NOTE — PROGRESS NOTES
[facility]   Clinic Visit s Feb 23, 2019    PCP: Abiel Negron      Ej is a 73 year old male who is seen in consultation at the request of Dr. Negron for   Chief Complaint   Patient presents with     Right Wrist - Pain       Injury: Reports insidious onset without acute precipitating event.     Right hand dominant    Location of Pain: right wrist diffuse, radiating to wrist   Duration of Pain: 2 week(s)  Rating of Pain at worst: 9/10  Rating of Pain Currently: 4/10  Pain is better with: activity avoidance   Pain is worse with: carrying (trash, groceries, laundry) and self care  Treatment so far consists of: ice and ibuprofen, indomethacin, labs with uric acid low  Associated symptoms: swelling Moderate  Recent imaging completed: X-rays completed 2/14/19.  Prior History of related problems: none    Indocin helped, flared up again after completing 7 day course.    Stiffness worse in the morning.    No known hx of inflammatory arthritis.      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 now 55%     Eczema      Esophageal reflux      Essential hypertension, benign      Hyperlipidemia LDL goal < 130      Idiopathic cardiomyopathy (H)      Ulcerative (chronic) proctitis (H)      Varicose veins of leg        Patient Active Problem List    Diagnosis Date Noted     Advanced directives, counseling/discussion 05/19/2011     Priority: High     Advance Care Planning 4/6/2018: ACP Review of Chart / Resources Provided:  Reviewed chart for advance care plan.  Ej CANTRELL Doris has an up to date advance care plan on file.  Added by Grace Henao              Advance Directive Problem List Overview:   Name Relationship Phone    Primary Health Care Agent Elizabeth Peoples Doris spouse [phone]         Alternative Health Care Agent Easton George son Not available       Advance  Directive received and scanned. Click on Code in the patient header to view.   Reviewed health care directive with patient   Current information re: alternative agent phone # is not accurate   Patient will update when he has current information 7/18/2011     Pt will bring in copy of health care directive at next appt       Morbid obesity (H) 08/17/2017     Priority: Medium     Pain in joint, lower leg 05/29/2014     Priority: Medium     Obesity 05/21/2014     Priority: Medium     Osteoarthrosis of knee 05/21/2014     Priority: Medium     Idiopathic cardiomyopathy (H) 02/10/2014     Priority: Medium     Hyperlipidemia with target LDL less than 130      Priority: Medium     Diagnosis updated by automated process. Provider to review and confirm.       ESOPHAGEAL REFLUX 12/26/2006     Priority: Medium     Ulcerative (chronic) proctitis (H) 01/17/2006     Priority: Medium     Essential hypertension, benign 09/10/2005     Priority: Medium       Family History   Problem Relation Age of Onset     Heart Disease Mother 62        d:age 62 MI     Heart Disease Father 86        D:86 chf     Cancer Sister         d:age 58 liver     Family History Negative Sister         b:1934     Hypertension Sister         b:1942     Cerebrovascular Disease Brother              Diabetes Brother      Dementia Brother      Prostate Cancer Brother 65     Lung Cancer Brother      Cancer Son 35        brain cancer - surgery with seizures     C.A.D. Son 36        tobacco       Social History     Socioeconomic History     Marital status:      Spouse name: Elizabeth     Number of children: 4     Years of education: 14     Highest education level: Not on file   Occupational History     Occupation: tool      Comment: [employer]   Social Needs     Financial resource strain: Not on file     Food insecurity:     Worry: Not on file     Inability: Not on file     Transportation needs:     Medical: Not on file     Non-medical: Not on  "file   Tobacco Use     Smoking status: Former Smoker     Packs/day: 0.50     Years: 14.00     Pack years: 7.00     Types: Cigarettes     Start date: 1961     Last attempt to quit: 1975     Years since quittin.1     Smokeless tobacco: Never Used   Substance and Sexual Activity     Alcohol use: Yes     Alcohol/week: 0.6 oz     Types: 1 Standard drinks or equivalent per week     Comment: occasionally     Drug use: No       Past Surgical History:   Procedure Laterality Date     ENDOSCOPIC STRIPPING VEIN(S)       HC REPAIR ROTATOR CUFF,ACUTE  8/10/05    right             Review of Systems   Musculoskeletal: Positive for joint pain.   All other systems reviewed and are negative.        Physical Exam  /67   Ht 1.778 m (5' 10\")   Wt 103.9 kg (229 lb)   BMI 32.86 kg/m    Constitutional:well-developed, well-nourished, and in no distress.   Cardiovascular: Intact distal pulses.    Neurological: alert. Gait Normal:   Gait, station, stance, and balance appear normal for age  Skin: Skin is warm and dry.   Psychiatric: Mood and affect normal.   Respiratory: unlabored, speaks in full sentences  Lymph: no LAD, no lymphangitis            Right Hand Exam     Tenderness   The patient is experiencing tenderness in the dorsal area.    Range of Motion   Wrist   Extension: 20   Flexion: 20   Pronation: normal   Supination: 40     Muscle Strength   Wrist extension: 5/5   Wrist flexion: 5/5   : 5/5     Tests   Phalen s Sign: negative  Tinel's sign (median nerve): negative    Other   Erythema: absent  Scars: absent  Sensation: normal  Pulse: present    Comments:  Dorsal radial wrist swelling, trace increased warmth      Left Hand Exam     Tenderness   The patient is experiencing tenderness in the dorsal area.     Range of Motion   Wrist   Extension: 30   Flexion: 30   Pronation: normal   Supination: 60     Muscle Strength   Wrist extension: 5/5   Wrist flexion: 5/5   :  5/5     Tests   Phalen s Sign: " negative  Tinel's sign (median nerve): negative    Other   Erythema: absent  Scars: absent  Sensation: normal  Pulse: present    Comments:  Slight dorsal radial wrist swelling, less than on right wrist, no warmth.             X-ray images Previously done and independently reviewed by me in the office today with the patient. X-ray shows:     Recent Results (from the past 744 hour(s))   XR Wrist Right G/E 3 Views    Narrative    WRIST THREE VIEWS RIGHT  2/14/2019 2:00 PM     HISTORY: Right wrist pain.    COMPARISON: None.    FINDINGS: There is no significant degenerative change. The  scapholunate interval appears within normal limits. There is no acute  fracture. No dislocation. There are no worrisome bony lesions.      Impression    IMPRESSION: No acute osseous abnormality demonstrated.    KATELIN FERGUSON MD     Component      Latest Ref Rng & Units 2/14/2019   Sodium      133 - 144 mmol/L 135   Potassium      3.4 - 5.3 mmol/L 4.0   Chloride      94 - 109 mmol/L 101   Carbon Dioxide      20 - 32 mmol/L 29   Anion Gap      3 - 14 mmol/L 5   Glucose      70 - 99 mg/dL 97   Urea Nitrogen      7 - 30 mg/dL 21   Creatinine      0.66 - 1.25 mg/dL 0.95   GFR Estimate      >60 mL/min/1.73:m2 78   GFR Estimate If Black      >60 mL/min/1.73:m2 >90   Calcium      8.5 - 10.1 mg/dL 9.1   Uric Acid      3.5 - 7.2 mg/dL 3.4 (L)       ASSESSMENT/PLAN    ICD-10-CM    1. Bilateral wrist pain M25.531 Erythrocyte sedimentation rate auto    M25.532 CRP inflammation     Cyclic Citrullinated Peptide Antibody IgG     Anti Nuclear Joselyn IgG by IFA with Reflex     predniSONE (DELTASONE) 20 MG tablet     Low serum uric acid makes gout unlikely, uncertain clinical significance of below normal levels, but there is no support for doubt at this point.  Location raises concern over possible rheumatoid arthritis, given remarkably negative appearance for any degenerative joint disease about the wrists.    Ibuprofen seems to control the pain temporarily.   Will do laboratory screening for other inflammatory disorders, and start prednisone once labs are drawn.  This cannot be done until a couple days when clinic is open and patient is comfortable with that process.  Will notify of results once completed via my chart.

## 2019-02-23 ENCOUNTER — OFFICE VISIT (OUTPATIENT)
Dept: ORTHOPEDICS | Facility: CLINIC | Age: 74
End: 2019-02-23
Payer: COMMERCIAL

## 2019-02-23 VITALS
DIASTOLIC BLOOD PRESSURE: 67 MMHG | SYSTOLIC BLOOD PRESSURE: 117 MMHG | WEIGHT: 229 LBS | BODY MASS INDEX: 32.78 KG/M2 | HEIGHT: 70 IN

## 2019-02-23 DIAGNOSIS — M25.531 BILATERAL WRIST PAIN: Primary | ICD-10-CM

## 2019-02-23 DIAGNOSIS — M25.532 BILATERAL WRIST PAIN: Primary | ICD-10-CM

## 2019-02-23 PROCEDURE — 99204 OFFICE O/P NEW MOD 45 MIN: CPT | Performed by: FAMILY MEDICINE

## 2019-02-23 RX ORDER — PREDNISONE 20 MG/1
TABLET ORAL
Qty: 18 TABLET | Refills: 0 | Status: SHIPPED | OUTPATIENT
Start: 2019-02-23 | End: 2019-03-15

## 2019-02-23 ASSESSMENT — MIFFLIN-ST. JEOR: SCORE: 1789.99

## 2019-02-23 NOTE — LETTER
2/23/2019         RE: Ej Garicadarylestuardo  [id number] [place] [address]        Dear Colleague,    Thank you for referring your patient, Ej George, to the [facility]. Please see a copy of my visit note below.    [facility]   Clinic Visit s Feb 23, 2019    PCP: Abiel Negron      Ej is a 73 year old male who is seen in consultation at the request of Dr. Negron for   Chief Complaint   Patient presents with     Right Wrist - Pain       Injury: Reports insidious onset without acute precipitating event.     Right hand dominant    Location of Pain: right wrist diffuse, radiating to wrist   Duration of Pain: 2 week(s)  Rating of Pain at worst: 9/10  Rating of Pain Currently: 4/10  Pain is better with: activity avoidance   Pain is worse with: carrying (trash, groceries, laundry) and self care  Treatment so far consists of: ice and ibuprofen, indomethacin, labs with uric acid low  Associated symptoms: swelling Moderate  Recent imaging completed: X-rays completed 2/14/19.  Prior History of related problems: none    Indocin helped, flared up again after completing 7 day course.    Stiffness worse in the morning.    No known hx of inflammatory arthritis.      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 now 55%     Eczema      Esophageal reflux      Essential hypertension, benign      Hyperlipidemia LDL goal < 130      Idiopathic cardiomyopathy (H)      Ulcerative (chronic) proctitis (H)      Varicose veins of leg        Patient Active Problem List    Diagnosis Date Noted     Advanced directives, counseling/discussion 05/19/2011     Priority: High     Advance Care Planning 4/6/2018: ACP Review of Chart / Resources Provided:  Reviewed chart for advance care plan.  Ej Garciadarylestuardo has an up to date advance care plan on file.  Added by  Grace Henao              Advance Directive Problem List Overview:   Name Relationship Phone    Primary Health Care Agent Elizabeth George spouse [phone]         Alternative Health Care Agent Easton George son Not available       Advance Directive received and scanned. Click on Code in the patient header to view.   Reviewed health care directive with patient   Current information re: alternative agent phone # is not accurate   Patient will update when he has current information 7/18/2011     Pt will bring in copy of health care directive at next appt       Morbid obesity (H) 08/17/2017     Priority: Medium     Pain in joint, lower leg 05/29/2014     Priority: Medium     Obesity 05/21/2014     Priority: Medium     Osteoarthrosis of knee 05/21/2014     Priority: Medium     Idiopathic cardiomyopathy (H) 02/10/2014     Priority: Medium     Hyperlipidemia with target LDL less than 130      Priority: Medium     Diagnosis updated by automated process. Provider to review and confirm.       ESOPHAGEAL REFLUX 12/26/2006     Priority: Medium     Ulcerative (chronic) proctitis (H) 01/17/2006     Priority: Medium     Essential hypertension, benign 09/10/2005     Priority: Medium       Family History   Problem Relation Age of Onset     Heart Disease Mother 62        d:age 62 MI     Heart Disease Father 86        D:86 chf     Cancer Sister         d:age 58 liver     Family History Negative Sister         b:1934     Hypertension Sister         b:1942     Cerebrovascular Disease Brother              Diabetes Brother      Dementia Brother      Prostate Cancer Brother 65     Lung Cancer Brother      Cancer Son 35        brain cancer - surgery with seizures     C.A.D. Son 36        tobacco       Social History     Socioeconomic History     Marital status:      Spouse name: Elizabeth     Number of children: 4     Years of education: 14     Highest education level: Not on file   Occupational History      "Occupation: tool      Comment: [employer]   Social Needs     Financial resource strain: Not on file     Food insecurity:     Worry: Not on file     Inability: Not on file     Transportation needs:     Medical: Not on file     Non-medical: Not on file   Tobacco Use     Smoking status: Former Smoker     Packs/day: 0.50     Years: 14.00     Pack years: 7.00     Types: Cigarettes     Start date: 1961     Last attempt to quit: 1975     Years since quittin.1     Smokeless tobacco: Never Used   Substance and Sexual Activity     Alcohol use: Yes     Alcohol/week: 0.6 oz     Types: 1 Standard drinks or equivalent per week     Comment: occasionally     Drug use: No       Past Surgical History:   Procedure Laterality Date     ENDOSCOPIC STRIPPING VEIN(S)       HC REPAIR ROTATOR CUFF,ACUTE  8/10/05    right             Review of Systems   Musculoskeletal: Positive for joint pain.   All other systems reviewed and are negative.        Physical Exam  /67   Ht 1.778 m (5' 10\")   Wt 103.9 kg (229 lb)   BMI 32.86 kg/m     Constitutional:well-developed, well-nourished, and in no distress.   Cardiovascular: Intact distal pulses.    Neurological: alert. Gait Normal:   Gait, station, stance, and balance appear normal for age  Skin: Skin is warm and dry.   Psychiatric: Mood and affect normal.   Respiratory: unlabored, speaks in full sentences  Lymph: no LAD, no lymphangitis            Right Hand Exam     Tenderness   The patient is experiencing tenderness in the dorsal area.    Range of Motion   Wrist   Extension: 20   Flexion: 20   Pronation: normal   Supination: 40     Muscle Strength   Wrist extension: 5/5   Wrist flexion: 5/5   : 5/5     Tests   Phalen s Sign: negative  Tinel's sign (median nerve): negative    Other   Erythema: absent  Scars: absent  Sensation: normal  Pulse: present    Comments:  Dorsal radial wrist swelling, trace increased warmth      Left Hand Exam     Tenderness   The " patient is experiencing tenderness in the dorsal area.     Range of Motion   Wrist   Extension: 30   Flexion: 30   Pronation: normal   Supination: 60     Muscle Strength   Wrist extension: 5/5   Wrist flexion: 5/5   :  5/5     Tests   Phalen s Sign: negative  Tinel's sign (median nerve): negative    Other   Erythema: absent  Scars: absent  Sensation: normal  Pulse: present    Comments:  Slight dorsal radial wrist swelling, less than on right wrist, no warmth.             X-ray images Previously done and independently reviewed by me in the office today with the patient. X-ray shows:     Recent Results (from the past 744 hour(s))   XR Wrist Right G/E 3 Views    Narrative    WRIST THREE VIEWS RIGHT  2/14/2019 2:00 PM     HISTORY: Right wrist pain.    COMPARISON: None.    FINDINGS: There is no significant degenerative change. The  scapholunate interval appears within normal limits. There is no acute  fracture. No dislocation. There are no worrisome bony lesions.      Impression    IMPRESSION: No acute osseous abnormality demonstrated.    KATELIN FERGUSON MD     Component      Latest Ref Rng & Units 2/14/2019   Sodium      133 - 144 mmol/L 135   Potassium      3.4 - 5.3 mmol/L 4.0   Chloride      94 - 109 mmol/L 101   Carbon Dioxide      20 - 32 mmol/L 29   Anion Gap      3 - 14 mmol/L 5   Glucose      70 - 99 mg/dL 97   Urea Nitrogen      7 - 30 mg/dL 21   Creatinine      0.66 - 1.25 mg/dL 0.95   GFR Estimate      >60 mL/min/1.73:m2 78   GFR Estimate If Black      >60 mL/min/1.73:m2 >90   Calcium      8.5 - 10.1 mg/dL 9.1   Uric Acid      3.5 - 7.2 mg/dL 3.4 (L)       ASSESSMENT/PLAN    ICD-10-CM    1. Bilateral wrist pain M25.531 Erythrocyte sedimentation rate auto    M25.532 CRP inflammation     Cyclic Citrullinated Peptide Antibody IgG     Anti Nuclear Joselyn IgG by IFA with Reflex     predniSONE (DELTASONE) 20 MG tablet     Low serum uric acid makes gout unlikely, uncertain clinical significance of below normal  levels, but there is no support for doubt at this point.  Location raises concern over possible rheumatoid arthritis, given remarkably negative appearance for any degenerative joint disease about the wrists.    Ibuprofen seems to control the pain temporarily.  Will do laboratory screening for other inflammatory disorders, and start prednisone once labs are drawn.  This cannot be done until a couple days when clinic is open and patient is comfortable with that process.  Will notify of results once completed via my chart.    Again, thank you for allowing me to participate in the care of your patient.        Sincerely,        Cem Shetty MD

## 2019-02-25 DIAGNOSIS — M25.531 BILATERAL WRIST PAIN: ICD-10-CM

## 2019-02-25 DIAGNOSIS — M25.532 BILATERAL WRIST PAIN: ICD-10-CM

## 2019-02-25 LAB
CRP SERPL-MCNC: 20 MG/L (ref 0–8)
ERYTHROCYTE [SEDIMENTATION RATE] IN BLOOD BY WESTERGREN METHOD: 16 MM/H (ref 0–20)

## 2019-02-25 PROCEDURE — 86200 CCP ANTIBODY: CPT | Performed by: FAMILY MEDICINE

## 2019-02-25 PROCEDURE — 85652 RBC SED RATE AUTOMATED: CPT | Performed by: FAMILY MEDICINE

## 2019-02-25 PROCEDURE — 86038 ANTINUCLEAR ANTIBODIES: CPT | Performed by: FAMILY MEDICINE

## 2019-02-25 PROCEDURE — 86140 C-REACTIVE PROTEIN: CPT | Performed by: FAMILY MEDICINE

## 2019-02-25 PROCEDURE — 36415 COLL VENOUS BLD VENIPUNCTURE: CPT | Performed by: FAMILY MEDICINE

## 2019-02-26 LAB
ANA SER QL IF: NEGATIVE
CCP AB SER IA-ACNC: 1 U/ML

## 2019-03-11 ENCOUNTER — TELEPHONE (OUTPATIENT)
Dept: INTERNAL MEDICINE | Facility: CLINIC | Age: 74
End: 2019-03-11

## 2019-03-11 DIAGNOSIS — M13.131: ICD-10-CM

## 2019-03-11 NOTE — TELEPHONE ENCOUNTER
Patient having bilateral wrist and hand pain. Seeing rheum on 4/15. Saw Dr. Shetty previously and was given Prednisone which helped. Taking ibuprofen and naproxen now, Alternating those today. Wondering if this is too much? Says Dr. Negron gave him indomethacin before too and that helped. Pain was just in right hand when saw Dr. Negron but now is in both hand and wrists. Mainly wrists . Back of hand pain. Some swelling on right hand but went down with prednisone. Left hand and wrist are having a little swelling now too. no redness, warmth, fever. Symptoms about the same as when saw Dr. Huizar except left hand bothering him more. ROM limited due to the pain. Hard to lift. Wondering if he needs more prednisone.    Please advise or should patient contact Dr. Shetty?

## 2019-03-11 NOTE — TELEPHONE ENCOUNTER
Spoke with pt. Already taking the Omeprazole  20mg daily. Will try IBU as recommended and call back if needed.MICHAEL Flynn, CMA

## 2019-03-11 NOTE — TELEPHONE ENCOUNTER
i'd use ibuprofen 400 mg tid - if this doesn't do it then increase to 600 mg tid.  Get some otc prilosec/omeprazole and take 20 mg daily along with this to protect his stomach lining.

## 2019-03-15 RX ORDER — INDOMETHACIN 50 MG/1
50 CAPSULE ORAL
Qty: 21 CAPSULE | Refills: 0 | Status: SHIPPED | OUTPATIENT
Start: 2019-03-15 | End: 2019-03-21

## 2019-03-15 NOTE — TELEPHONE ENCOUNTER
"Pt has been taking the ibp 600mg tid, but not working.  Rates pain \"close to a 10\", keeping him up at night.  He only got thru half a day of work yesterday and had to come home due to the pain.  Anything else you can recommend??  "

## 2019-03-16 ENCOUNTER — NURSE TRIAGE (OUTPATIENT)
Dept: NURSING | Facility: CLINIC | Age: 74
End: 2019-03-16

## 2019-03-16 NOTE — TELEPHONE ENCOUNTER
Patient calling stating he had contacted the clinic regarding swelling in the hands. Patient is questioning if there is anything more that they can do? See notes from 3/15/19. Dr Negron ordered Indomethacin TID rather then BID as before. Patient notified prescription was at the pharmacy.   Caller verbalized understanding. Denies further questions.    Karen Obrien RN  [facility]

## 2019-03-18 NOTE — TELEPHONE ENCOUNTER
Spoke with pt. He picked up RX and has been taking since Sat. with good results.   May need additional amount if OK until his appointment with specialist on April 15.  Pt will call back if needed.MICHAEL Flynn, CMA

## 2019-03-21 ENCOUNTER — TELEPHONE (OUTPATIENT)
Dept: INTERNAL MEDICINE | Facility: CLINIC | Age: 74
End: 2019-03-21

## 2019-03-21 DIAGNOSIS — M19.90 INFLAMMATORY ARTHRITIS: Primary | ICD-10-CM

## 2019-03-21 RX ORDER — PREDNISONE 10 MG/1
10 TABLET ORAL DAILY
Qty: 21 TABLET | Refills: 0 | Status: SHIPPED | OUTPATIENT
Start: 2019-03-21 | End: 2019-03-29

## 2019-03-21 NOTE — TELEPHONE ENCOUNTER
Can try low dose prednisone 10 mg   Would have us see if we can get him into Rheum before 4/14 appt.   ask Dr. Leblanc's support staff. Maybe they have cancel list,etc

## 2019-03-21 NOTE — TELEPHONE ENCOUNTER
"Reason for Call:  Other call back    Detailed comments: Patient is 2 days from finishing his indomethacin medication and says it's not working/his symptoms are \"starting over\". What should he do next? Please call & advise.    Phone Number Patient can be reached at: Home number on file [phone] (home)    Best Time: Anytime    Can we leave a detailed message on this number? NO- please talk to him directly    Call taken on 3/21/2019 at 9:40 AM by Lelo Dahl    "

## 2019-03-21 NOTE — TELEPHONE ENCOUNTER
Patient states that his symptoms started to get a little bit better but now with two days left of the medication his symptoms are starting to come back and does not feel the medication is doing what it should be. Patient would like to know what you would like to do now?

## 2019-03-21 NOTE — TELEPHONE ENCOUNTER
Pt notified and will  prescription and RN will reach out to Rheum tomorrow for appt before 4/14.Christelle Suazo RN

## 2019-03-25 NOTE — TELEPHONE ENCOUNTER
Pt called to check on when the appt with Dr Leblanc might be.  Pt stated he could come in at anytime. Please contact pt.

## 2019-03-26 NOTE — PROGRESS NOTES
[place] - Rheumatology Clinic Visit     Ej George MRN# [id number]   YOB: 1945    Primary care provider: Abiel Negron  Mar 29, 2019          Assessment and Plan:   # Inflammatory arthritis  # Elevated CRP  # H/o ulcerative proctitis- about 2003; resolved per patient  # H/o ischemic cardiomyopathy    Sed rate within normal limits  Creat within normal limits  ALT within normal limits  CCP neg. BRIAN neg.   Uric acid < 4.   HCV neg.     No suspicion for pseudogout at this time. But seronegative rheumatoid arthritis is the working diagnosis.     Disease activity: Severe on prednisone 10mg PO daily.     We will do the following work up. We will meet again in few weeks to review the lab results and discuss management. In the meantime, we will continue prednisone at 10mg PO daily.     We will also start leflunomide (more convenient than methotrexate in terms of dosing). Side effect risk discussed.     The labs from patient records are reviewed.     I will be back in touch with the patient through mychart/letter when results are available.     Patient agrees with the above mentioned treatment plan.     Most Recent Immunizations   Administered Date(s) Administered     Influenza (High Dose) 3 valent vaccine 11/27/2018     Influenza (IIV3) PF 11/20/2009     Pneumo Conj 13-V (2010&after) 02/25/2016     Pneumococcal 23 valent 05/12/2011     TD (ADULT, 7+) 07/02/2008       Orders Placed This Encounter   Procedures     X-ray bl hand 3+ vw     Rheumatoid factor     CBC with platelets differential     CBC with platelets     AST     ALT     Creatinine     Hepatitis B surface antigen       Return in about 4 weeks (around 4/26/2019).    There are no discontinued medications.  Current Outpatient Medications   Medication Sig Dispense Refill     carvedilol (COREG) 25 MG tablet Take 1 tablet (25 mg) by mouth 2 times daily (with meals) 180 tablet 0     GLUCOSAMINE 500 MG OR CAPS 1 tablet twice daily 60 5      leflunomide (ARAVA) 20 MG tablet Take 1 tablet (20 mg) by mouth daily Labs every 8-12 weeks 90 tablet 0     lisinopril-hydrochlorothiazide (PRINZIDE/ZESTORETIC) 20-25 MG tablet Take 1 tablet by mouth daily 90 tablet 0     OMEPRAZOLE 20 MG PO CPDR take 30'-60' before 1st meal daily 93 Cap 3     predniSONE (DELTASONE) 10 MG tablet Take 10 mg by mouth daily. 21 tablet 0     simvastatin (ZOCOR) 40 MG tablet Take 1 tablet (40 mg) by mouth At Bedtime 90 tablet 0     spironolactone (ALDACTONE) 25 MG tablet Take 1 tablet (25 mg) by mouth daily 90 tablet 0     triamcinolone (KENALOG) 0.1 % external ointment Apply to AA BID x 1-2 weeks then  g 0     RITO-MIN TABS   OR 1 TABLET DAILY 30 5     ORDER FOR DME Jobst stockings (Patient not taking: Reported on 3/29/2019) 4 5       Luis Leblanc MD  [facility]          Active Problem List:     Patient Active Problem List    Diagnosis Date Noted     Advanced directives, counseling/discussion 05/19/2011     Priority: High     Advance Care Planning 4/6/2018: ACP Review of Chart / Resources Provided:  Reviewed chart for advance care plan.  Ej CANTRELL Doris has an up to date advance care plan on file.  Added by Grace Henao              Advance Directive Problem List Overview:   Name Relationship Phone    Primary Health Care Agent Elizbaeth George spouse [phone]         Alternative Health Care Agent Easton George son Not available       Advance Directive received and scanned. Click on Code in the patient header to view.   Reviewed health care directive with patient   Current information re: alternative agent phone # is not accurate   Patient will update when he has current information 7/18/2011     Pt will bring in copy of health care directive at next appt       Morbid obesity (H) 08/17/2017     Priority: Medium     Pain in joint, lower leg 05/29/2014     Priority: Medium     Obesity 05/21/2014     Priority: Medium     Osteoarthrosis of  knee 05/21/2014     Priority: Medium     Idiopathic cardiomyopathy (H) 02/10/2014     Priority: Medium     Hyperlipidemia with target LDL less than 130      Priority: Medium     Diagnosis updated by automated process. Provider to review and confirm.       ESOPHAGEAL REFLUX 12/26/2006     Priority: Medium     Ulcerative (chronic) proctitis (H) 01/17/2006     Priority: Medium     Essential hypertension, benign 09/10/2005     Priority: Medium            History of Present Illness:     Chief Complaint   Patient presents with     Establish Care     Referral     Cem Shetty MD       March 26, 2019  Have you ever seen a rheumatologist No Who NA When NA  Joint pain history  Onset: Patient is here to establish care for both wrist and hand pain and some swelling  Involved joints: both hands, wrists for the past 2 months  Pain scale:  8/10     Wakes the patient from sleep : Yes  Morning stiffness:Yes for 120 minutes  Meds used:prednisone, motrin     Interim history  Since last visit:  1. Infections - No  2. New symptoms/medical problem - No  3. Any side effects from Rheum medications -None  3. ER visits/Hospitalizations/surgeries - No  4. Last PCP visit: 2/14/19    Wt Readings from Last 4 Encounters:   03/29/19 103.4 kg (228 lb)   02/23/19 103.9 kg (229 lb)   02/14/19 104.2 kg (229 lb 12.8 oz)   07/24/18 108.6 kg (239 lb 8 oz)   with diet.     No h/o unintentional weight loss, fevers, rash, swollen glands  No h/o gout  No family or personal history of psoriasis, chron's disease. No h/o iritis or glaucoma.  Patient denies any raynauds  No h/o persistent shortness of breath, cough, chest pain  No h/o persistent vomiting, diarrhea, abdominal pain  No h/o hematochezia, hematuria, hemoptysis  No h/o seizures or strokes  No h/o cancer    BP Readings from Last 3 Encounters:   03/29/19 116/72   02/23/19 117/67   02/14/19 (!) 134/92              Review of Systems:   Complete ROS negative except for symptoms mentioned in  the HPI          Past Medical History:     Past Medical History:   Diagnosis Date     CHF (congestive heart failure) (H)     EF 40-45%. now 55%     Eczema      Esophageal reflux      Essential hypertension, benign      Hyperlipidemia LDL goal < 130      Idiopathic cardiomyopathy (H)      Ulcerative (chronic) proctitis (H)      Varicose veins of leg      Past Surgical History:   Procedure Laterality Date     ENDOSCOPIC STRIPPING VEIN(S)       HC REPAIR ROTATOR CUFF,ACUTE  8/10/05    right            Social History:     Social History     Occupational History     Occupation: tool      Comment: [employer]   Tobacco Use     Smoking status: Former Smoker     Packs/day: 0.50     Years: 14.00     Pack years: 7.00     Types: Cigarettes     Start date: 1961     Last attempt to quit: 1975     Years since quittin.2     Smokeless tobacco: Never Used   Substance and Sexual Activity     Alcohol use: Yes     Alcohol/week: 0.6 oz     Types: 1 Standard drinks or equivalent per week     Comment: occasionally     Drug use: No     Sexual activity: Yes     Partners: Female            Family History:     Family History   Problem Relation Age of Onset     Heart Disease Mother 62        d:age 62 MI     Heart Disease Father 86        D:86 chf     Cancer Sister         d:age 58 liver     Family History Negative Sister         b:1934     Hypertension Sister         b:1942     Cerebrovascular Disease Brother         11-     Diabetes Brother      Dementia Brother      Prostate Cancer Brother 65     Lung Cancer Brother      Cancer Son 35        brain cancer - surgery with seizures     C.A.D. Son 36        tobacco            Allergies:   No Known Allergies         Medications:     Current Outpatient Medications   Medication Sig Dispense Refill     carvedilol (COREG) 25 MG tablet Take 1 tablet (25 mg) by mouth 2 times daily (with meals) 180 tablet 0     GLUCOSAMINE 500 MG OR CAPS 1 tablet twice daily 60 5      "leflunomide (ARAVA) 20 MG tablet Take 1 tablet (20 mg) by mouth daily Labs every 8-12 weeks 90 tablet 0     lisinopril-hydrochlorothiazide (PRINZIDE/ZESTORETIC) 20-25 MG tablet Take 1 tablet by mouth daily 90 tablet 0     OMEPRAZOLE 20 MG PO CPDR take 30'-60' before 1st meal daily 93 Cap 3     predniSONE (DELTASONE) 10 MG tablet Take 10 mg by mouth daily. 21 tablet 0     simvastatin (ZOCOR) 40 MG tablet Take 1 tablet (40 mg) by mouth At Bedtime 90 tablet 0     spironolactone (ALDACTONE) 25 MG tablet Take 1 tablet (25 mg) by mouth daily 90 tablet 0     triamcinolone (KENALOG) 0.1 % external ointment Apply to AA BID x 1-2 weeks then  g 0     RITO-MIN TABS   OR 1 TABLET DAILY 30 5     ORDER FOR DME Jobst stockings (Patient not taking: Reported on 3/29/2019) 4 5            Physical Exam:   Blood pressure 116/72, pulse 58, temperature 97.8  F (36.6  C), temperature source Oral, height 1.778 m (5' 10\"), weight 103.4 kg (228 lb), SpO2 96 %.  Wt Readings from Last 4 Encounters:   03/29/19 103.4 kg (228 lb)   02/23/19 103.9 kg (229 lb)   02/14/19 104.2 kg (229 lb 12.8 oz)   07/24/18 108.6 kg (239 lb 8 oz)       Constitutional: well-developed, appearing stated age; cooperative  Eyes: normal conjunctiva, sclera  ENT: nl external ears, nose, lips.No mucous membrane lesions, normal saliva pool  Neck: no cervical lymphadenopathy  Resp: lungs clear to auscultation in the bases,   CV: RRR, no added sounds  GI: Abdomen soft and no tenderness  : not tested  Lymph: no cervical, supraclavicular or epitrochlear nodes  MS: Bilateral synovitis in wrists. Fist 80% bilateral. Synovitis noted in multiple PIPs bilateral. Bony swellings noted in DIPs.   All shoulder, elbow, wrist, MCP/PIP/DIP, hip, knee, ankle, and foot MTP/IP joints were examined and  found without active synovitis or major deformity. Full ROM.  No dactylitis,  tenosynovitis, enthesopathy.  Skin: no rash in exposed areas  Psych: nl judgement, orientation, memory, " affect.         Data:         Luis Leblanc MD    [facility]     - no NSAIDs  - tylenol 650mg PO q6 PRN mild pain.

## 2019-03-26 NOTE — TELEPHONE ENCOUNTER
I called and spoke to wife Elizabeth, patient was not available, and scheduled for 3/29/19 at 11 AM at [facility]. Elizabeth will inform patient.    Anuja Lazaro, [facility]

## 2019-03-29 ENCOUNTER — OFFICE VISIT (OUTPATIENT)
Dept: RHEUMATOLOGY | Facility: CLINIC | Age: 74
End: 2019-03-29
Payer: COMMERCIAL

## 2019-03-29 ENCOUNTER — ANCILLARY PROCEDURE (OUTPATIENT)
Dept: GENERAL RADIOLOGY | Facility: CLINIC | Age: 74
End: 2019-03-29
Attending: INTERNAL MEDICINE
Payer: COMMERCIAL

## 2019-03-29 VITALS
OXYGEN SATURATION: 96 % | WEIGHT: 228 LBS | SYSTOLIC BLOOD PRESSURE: 116 MMHG | BODY MASS INDEX: 32.64 KG/M2 | HEART RATE: 58 BPM | DIASTOLIC BLOOD PRESSURE: 72 MMHG | HEIGHT: 70 IN | TEMPERATURE: 97.8 F

## 2019-03-29 DIAGNOSIS — Z51.81 ENCOUNTER FOR MONITORING LEFLUNOMIDE THERAPY: ICD-10-CM

## 2019-03-29 DIAGNOSIS — Z79.899 ENCOUNTER FOR MONITORING LEFLUNOMIDE THERAPY: ICD-10-CM

## 2019-03-29 DIAGNOSIS — Z11.59 ENCOUNTER FOR SCREENING FOR OTHER VIRAL DISEASES: ICD-10-CM

## 2019-03-29 DIAGNOSIS — M19.90 INFLAMMATORY ARTHRITIS: ICD-10-CM

## 2019-03-29 DIAGNOSIS — M19.90 INFLAMMATORY ARTHRITIS: Primary | ICD-10-CM

## 2019-03-29 LAB
BASOPHILS # BLD AUTO: 0 10E9/L (ref 0–0.2)
BASOPHILS NFR BLD AUTO: 0.3 %
DIFFERENTIAL METHOD BLD: ABNORMAL
EOSINOPHIL # BLD AUTO: 0.1 10E9/L (ref 0–0.7)
EOSINOPHIL NFR BLD AUTO: 0.6 %
ERYTHROCYTE [DISTWIDTH] IN BLOOD BY AUTOMATED COUNT: 13.4 % (ref 10–15)
HCT VFR BLD AUTO: 44 % (ref 40–53)
HGB BLD-MCNC: 14.3 G/DL (ref 13.3–17.7)
LYMPHOCYTES # BLD AUTO: 2 10E9/L (ref 0.8–5.3)
LYMPHOCYTES NFR BLD AUTO: 16.4 %
MCH RBC QN AUTO: 27.9 PG (ref 26.5–33)
MCHC RBC AUTO-ENTMCNC: 32.5 G/DL (ref 31.5–36.5)
MCV RBC AUTO: 86 FL (ref 78–100)
MONOCYTES # BLD AUTO: 0.8 10E9/L (ref 0–1.3)
MONOCYTES NFR BLD AUTO: 6.8 %
NEUTROPHILS # BLD AUTO: 9.1 10E9/L (ref 1.6–8.3)
NEUTROPHILS NFR BLD AUTO: 75.9 %
PLATELET # BLD AUTO: 324 10E9/L (ref 150–450)
RBC # BLD AUTO: 5.12 10E12/L (ref 4.4–5.9)
WBC # BLD AUTO: 12 10E9/L (ref 4–11)

## 2019-03-29 PROCEDURE — 84550 ASSAY OF BLOOD/URIC ACID: CPT | Performed by: INTERNAL MEDICINE

## 2019-03-29 PROCEDURE — 85025 COMPLETE CBC W/AUTO DIFF WBC: CPT | Performed by: INTERNAL MEDICINE

## 2019-03-29 PROCEDURE — 73130 X-RAY EXAM OF HAND: CPT | Mod: LT

## 2019-03-29 PROCEDURE — 86431 RHEUMATOID FACTOR QUANT: CPT | Performed by: INTERNAL MEDICINE

## 2019-03-29 PROCEDURE — 36415 COLL VENOUS BLD VENIPUNCTURE: CPT | Performed by: INTERNAL MEDICINE

## 2019-03-29 PROCEDURE — 86481 TB AG RESPONSE T-CELL SUSP: CPT | Performed by: INTERNAL MEDICINE

## 2019-03-29 PROCEDURE — G0499 HEPB SCREEN HIGH RISK INDIV: HCPCS | Performed by: INTERNAL MEDICINE

## 2019-03-29 PROCEDURE — 99204 OFFICE O/P NEW MOD 45 MIN: CPT | Performed by: INTERNAL MEDICINE

## 2019-03-29 RX ORDER — PREDNISONE 10 MG/1
10 TABLET ORAL DAILY
Qty: 60 TABLET | Refills: 0 | Status: SHIPPED | OUTPATIENT
Start: 2019-03-29 | End: 2019-04-24

## 2019-03-29 RX ORDER — LEFLUNOMIDE 20 MG/1
20 TABLET ORAL DAILY
Qty: 90 TABLET | Refills: 0 | Status: SHIPPED | OUTPATIENT
Start: 2019-03-29 | End: 2019-05-30

## 2019-03-29 ASSESSMENT — MIFFLIN-ST. JEOR: SCORE: 1785.45

## 2019-03-29 ASSESSMENT — ROUTINE ASSESSMENT OF PATIENT INDEX DATA (RAPID3)
RAPID3 INTERPRETATION: HIGH > 12.0
TOTAL RAPID3 SCORE: 18

## 2019-03-29 NOTE — NURSING NOTE
"Chief Complaint   Patient presents with     Establish Care     Referral     Cem Shetty MD       Initial /72   Pulse 58   Temp 97.8  F (36.6  C) (Oral)   Ht 1.778 m (5' 10\")   Wt 103.4 kg (228 lb)   SpO2 96%   BMI 32.71 kg/m   Estimated body mass index is 32.71 kg/m  as calculated from the following:    Height as of this encounter: 1.778 m (5' 10\").    Weight as of this encounter: 103.4 kg (228 lb).  Medication Reconciliation: complete    Have you ever seen a rheumatologist No Who NA When NA  Joint pain history  Onset: Patient is here to establish care for both wrist and hand pain and some swelling  Involved joints: both hands, wrists  Pain scale:  8/10     Wakes the patient from sleep : Yes  Morning stiffness:Yes for 120 minutes  Meds used:prednisone, motrin    Interim history  Since last visit:  1. Infections - No  2. New symptoms/medical problem - No  3. Any side effects from Rheum medications -None  3. ER visits/Hospitalizations/surgeries - No  4. Last PCP visit: 2/14/19  Wt Readings from Last 4 Encounters:   03/29/19 103.4 kg (228 lb)   02/23/19 103.9 kg (229 lb)   02/14/19 104.2 kg (229 lb 12.8 oz)   07/24/18 108.6 kg (239 lb 8 oz)     BP Readings from Last 3 Encounters:   03/29/19 116/72   02/23/19 117/67   02/14/19 (!) 134/92       "

## 2019-03-30 LAB
HBV SURFACE AG SERPL QL IA: NONREACTIVE
URATE SERPL-MCNC: 3.6 MG/DL (ref 3.5–7.2)

## 2019-04-01 LAB
GAMMA INTERFERON BACKGROUND BLD IA-ACNC: 0.02 IU/ML
M TB IFN-G BLD-IMP: NEGATIVE
M TB IFN-G CD4+ BCKGRND COR BLD-ACNC: 8.44 IU/ML
MITOGEN IGNF BCKGRD COR BLD-ACNC: 0 IU/ML
MITOGEN IGNF BCKGRD COR BLD-ACNC: 0 IU/ML
RHEUMATOID FACT SER NEPH-ACNC: <20 IU/ML (ref 0–20)

## 2019-04-02 NOTE — RESULT ENCOUNTER NOTE
Results released to [facility]:  Uric acid gout test is normal.   Screening for Chronic Hepatitis B, and TB are negative.  Rheumatoid antibody normal.   Elevated white cell count can be monitored.     Sincerely    Luis Leblanc MD  [facility]

## 2019-04-14 DIAGNOSIS — I10 ESSENTIAL HYPERTENSION, BENIGN: ICD-10-CM

## 2019-04-14 DIAGNOSIS — I42.9 IDIOPATHIC CARDIOMYOPATHY (H): ICD-10-CM

## 2019-04-15 ENCOUNTER — TELEPHONE (OUTPATIENT)
Dept: INTERNAL MEDICINE | Facility: CLINIC | Age: 74
End: 2019-04-15

## 2019-04-15 DIAGNOSIS — I10 ESSENTIAL HYPERTENSION, BENIGN: ICD-10-CM

## 2019-04-15 DIAGNOSIS — E78.5 HYPERLIPIDEMIA WITH TARGET LDL LESS THAN 130: Primary | ICD-10-CM

## 2019-04-15 NOTE — TELEPHONE ENCOUNTER
"Requested Prescriptions   Pending Prescriptions Disp Refills     carvedilol (COREG) 25 MG tablet [Pharmacy Med Name: CARVEDILOL 25 MG Tablet] 180 tablet 0     Sig: TAKE 1 TABLET TWICE DAILY WITH MEALS   Last Written Prescription Date:  1/15/2019  Last Fill Quantity: 180,  # refills: 0   Last office visit: 2/14/2019 with prescribing provider:  2/14/2019   Future Office Visit:   Next 5 appointments (look out 90 days)    Apr 24, 2019  3:30 PM CDT  Return Visit with Luis Leblanc MD  [facility] ([facility]) [address] [id number] [phone]             Beta-Blockers Protocol Passed - 4/14/2019 12:10 PM        Passed - Blood pressure under 140/90 in past 12 months     BP Readings from Last 3 Encounters:   03/29/19 116/72   02/23/19 117/67   02/14/19 (!) 134/92                 Passed - Patient is age 6 or older        Passed - Recent (12 mo) or future (30 days) visit within the authorizing provider's specialty     Patient had office visit in the last 12 months or has a visit in the next 30 days with authorizing provider or within the authorizing provider's specialty.  See \"Patient Info\" tab in inbasket, or \"Choose Columns\" in Meds & Orders section of the refill encounter.              Passed - Medication is active on med list          "

## 2019-04-15 NOTE — TELEPHONE ENCOUNTER
PCP are you ok with ordering routine labs as patient has upcoming lab only visit?    Looks like he has standing, CR, ALT, AST, and CBC.    Per chart review he last had Lipid, BMP around 3/20/2018.    Future Office Visit:   Next 5 appointments (look out 90 days)    Apr 24, 2019  3:30 PM CDT  Return Visit with Luis Leblanc MD  [facility] ([facility] [address] [place] [phone]   Apr 26, 2019 10:20 AM CDT  PHYSICAL with Abiel Negron MD  [facility] ([facility]) [address] [place] [id number] [phone]

## 2019-04-15 NOTE — TELEPHONE ENCOUNTER
Patient would like orders placed in his chart to do labs since he is coming in on 4/18/2019 to do them for Dr العلي

## 2019-04-16 RX ORDER — CARVEDILOL 25 MG/1
TABLET ORAL
Qty: 180 TABLET | Refills: 2 | Status: SHIPPED | OUTPATIENT
Start: 2019-04-16 | End: 2019-04-26

## 2019-04-18 DIAGNOSIS — E78.5 HYPERLIPIDEMIA WITH TARGET LDL LESS THAN 130: ICD-10-CM

## 2019-04-18 DIAGNOSIS — I10 ESSENTIAL HYPERTENSION, BENIGN: ICD-10-CM

## 2019-04-18 DIAGNOSIS — Z51.81 ENCOUNTER FOR MONITORING LEFLUNOMIDE THERAPY: ICD-10-CM

## 2019-04-18 DIAGNOSIS — Z79.899 ENCOUNTER FOR MONITORING LEFLUNOMIDE THERAPY: ICD-10-CM

## 2019-04-18 LAB
ALT SERPL W P-5'-P-CCNC: 29 U/L (ref 0–70)
ANION GAP SERPL CALCULATED.3IONS-SCNC: 7 MMOL/L (ref 3–14)
AST SERPL W P-5'-P-CCNC: 16 U/L (ref 0–45)
BUN SERPL-MCNC: 23 MG/DL (ref 7–30)
CALCIUM SERPL-MCNC: 8.7 MG/DL (ref 8.5–10.1)
CHLORIDE SERPL-SCNC: 102 MMOL/L (ref 94–109)
CHOLEST SERPL-MCNC: 166 MG/DL
CO2 SERPL-SCNC: 27 MMOL/L (ref 20–32)
CREAT SERPL-MCNC: 1.01 MG/DL (ref 0.66–1.25)
ERYTHROCYTE [DISTWIDTH] IN BLOOD BY AUTOMATED COUNT: 14.5 % (ref 10–15)
GFR SERPL CREATININE-BSD FRML MDRD: 73 ML/MIN/{1.73_M2}
GLUCOSE SERPL-MCNC: 105 MG/DL (ref 70–99)
HCT VFR BLD AUTO: 43.8 % (ref 40–53)
HDLC SERPL-MCNC: 49 MG/DL
HGB BLD-MCNC: 14.3 G/DL (ref 13.3–17.7)
LDLC SERPL CALC-MCNC: 94 MG/DL
MCH RBC QN AUTO: 28 PG (ref 26.5–33)
MCHC RBC AUTO-ENTMCNC: 32.6 G/DL (ref 31.5–36.5)
MCV RBC AUTO: 86 FL (ref 78–100)
NONHDLC SERPL-MCNC: 117 MG/DL
PLATELET # BLD AUTO: 175 10E9/L (ref 150–450)
POTASSIUM SERPL-SCNC: 4.3 MMOL/L (ref 3.4–5.3)
RBC # BLD AUTO: 5.11 10E12/L (ref 4.4–5.9)
SODIUM SERPL-SCNC: 136 MMOL/L (ref 133–144)
TRIGL SERPL-MCNC: 115 MG/DL
WBC # BLD AUTO: 7 10E9/L (ref 4–11)

## 2019-04-18 PROCEDURE — 84450 TRANSFERASE (AST) (SGOT): CPT | Performed by: INTERNAL MEDICINE

## 2019-04-18 PROCEDURE — 80061 LIPID PANEL: CPT | Performed by: INTERNAL MEDICINE

## 2019-04-18 PROCEDURE — 85027 COMPLETE CBC AUTOMATED: CPT | Performed by: INTERNAL MEDICINE

## 2019-04-18 PROCEDURE — 84460 ALANINE AMINO (ALT) (SGPT): CPT | Performed by: INTERNAL MEDICINE

## 2019-04-18 PROCEDURE — 80048 BASIC METABOLIC PNL TOTAL CA: CPT | Performed by: INTERNAL MEDICINE

## 2019-04-18 PROCEDURE — 36415 COLL VENOUS BLD VENIPUNCTURE: CPT | Performed by: INTERNAL MEDICINE

## 2019-04-19 NOTE — RESULT ENCOUNTER NOTE
Results released to [facility]:  White cell count normalized. Good.       Sincerely    Luis Leblanc MD  [facility]

## 2019-04-24 ENCOUNTER — OFFICE VISIT (OUTPATIENT)
Dept: RHEUMATOLOGY | Facility: CLINIC | Age: 74
End: 2019-04-24
Payer: COMMERCIAL

## 2019-04-24 VITALS
HEART RATE: 81 BPM | OXYGEN SATURATION: 94 % | SYSTOLIC BLOOD PRESSURE: 106 MMHG | HEIGHT: 70 IN | TEMPERATURE: 98.2 F | DIASTOLIC BLOOD PRESSURE: 64 MMHG | BODY MASS INDEX: 32.35 KG/M2 | WEIGHT: 226 LBS

## 2019-04-24 DIAGNOSIS — M19.90 INFLAMMATORY ARTHRITIS: ICD-10-CM

## 2019-04-24 PROCEDURE — 99213 OFFICE O/P EST LOW 20 MIN: CPT | Performed by: INTERNAL MEDICINE

## 2019-04-24 RX ORDER — PREDNISONE 10 MG/1
TABLET ORAL
Qty: 60 TABLET | Refills: 0 | COMMUNITY
Start: 2019-04-24 | End: 2019-05-30

## 2019-04-24 ASSESSMENT — MIFFLIN-ST. JEOR: SCORE: 1776.38

## 2019-04-24 ASSESSMENT — ROUTINE ASSESSMENT OF PATIENT INDEX DATA (RAPID3)
TOTAL RAPID3 SCORE: 4.8
RAPID3 INTERPRETATION: LOW 3.1-6

## 2019-04-24 NOTE — NURSING NOTE
"Chief Complaint   Patient presents with     RECHECK       Initial /64   Pulse 81   Temp 98.2  F (36.8  C) (Oral)   Ht 1.778 m (5' 10\")   Wt 102.5 kg (226 lb)   SpO2 94%   BMI 32.43 kg/m   Estimated body mass index is 32.43 kg/m  as calculated from the following:    Height as of this encounter: 1.778 m (5' 10\").    Weight as of this encounter: 102.5 kg (226 lb).  Medication Reconciliation: complete    Have you ever seen a rheumatologist yes Who you When 3/29/19  Joint pain history  Onset: Patient is here for a follow up. Patient states since LOV hands are moving as they should be. No swelling since LOV.   Involved joints: hands, knees  Pain scale:  2/10     Wakes the patient from sleep : No  Morning stiffness:Yes for 2 minutes  Meds used:prednisone, leflunomide    Interim history  Since last visit:  1. Infections - No  2. New symptoms/medical problem - No  3. Any side effects from Rheum medications -none  3. ER visits/Hospitalizations/surgeries - No  4. Last PCP visit: 2/14/19  Wt Readings from Last 4 Encounters:   04/24/19 102.5 kg (226 lb)   03/29/19 103.4 kg (228 lb)   02/23/19 103.9 kg (229 lb)   02/14/19 104.2 kg (229 lb 12.8 oz)     BP Readings from Last 3 Encounters:   04/24/19 106/64   03/29/19 116/72   02/23/19 117/67       "

## 2019-04-24 NOTE — PROGRESS NOTES
[place] - Rheumatology Clinic Visit     Ej George MRN# [id number]   YOB: 1945    Primary care provider: Abiel Negron  Apr 24, 2019          Assessment and Plan:   # SERONEGATIVE RHEUMATOID ARTHRITIS (Dx 2019)  # Elevated CRP  # H/o ulcerative proctitis- about 2003; resolved per patient  # H/o ischemic cardiomyopathy    Sed rate within normal limits  Creat within normal limits  ALT within normal limits  CCP neg. BRIAN neg.   Uric acid < 4  HCV neg.     Uric acid gout test is normal.   Screening for Chronic Hepatitis B, and TB are negative.   Rheumatoid antibody normal.   Elevated white cell count can be monitored.     No suspicion for pseudogout at this time. But seronegative rheumatoid arthritis is the working diagnosis.     Disease activity: Moderate on leflunomide 20mg PO daily and prednisone 10mg PO daily. Very dramatic response to leflunomide. Start prednisone taper and leflunomide is expected to work better in few more weeks.     Started leflunomide as it is more convenient than methotrexate in terms of dosing for the patient.     Notified patient that my last day at clinic in [place] is mid-June. Gave options regarding other rheumatologists in the system or [facility] to [facility].     The labs from patient records are reviewed.     I will be back in touch with the patient through mychart/letter when results are available.     Patient agrees with the above mentioned treatment plan.     Most Recent Immunizations   Administered Date(s) Administered     Influenza (High Dose) 3 valent vaccine 11/27/2018     Influenza (IIV3) PF 11/20/2009     Pneumo Conj 13-V (2010&after) 02/25/2016     Pneumococcal 23 valent 05/12/2011     TD (ADULT, 7+) 07/02/2008       No orders of the defined types were placed in this encounter.      Return in about 6 weeks (around 6/5/2019).    Medications Discontinued During This Encounter   Medication Reason     predniSONE (DELTASONE) 10 MG tablet       Current Outpatient Medications   Medication Sig Dispense Refill     carvedilol (COREG) 25 MG tablet TAKE 1 TABLET TWICE DAILY WITH MEALS 180 tablet 2     GLUCOSAMINE 500 MG OR CAPS 1 tablet twice daily 60 5     leflunomide (ARAVA) 20 MG tablet Take 1 tablet (20 mg) by mouth daily Labs every 8-12 weeks 90 tablet 0     lisinopril-hydrochlorothiazide (PRINZIDE/ZESTORETIC) 20-25 MG tablet Take 1 tablet by mouth daily 90 tablet 0     predniSONE (DELTASONE) 10 MG tablet Take 5 mg PO daily X 4 weeks and then stop. 60 tablet 0     simvastatin (ZOCOR) 40 MG tablet Take 1 tablet (40 mg) by mouth At Bedtime 90 tablet 0     spironolactone (ALDACTONE) 25 MG tablet Take 1 tablet (25 mg) by mouth daily 90 tablet 0     triamcinolone (KENALOG) 0.1 % external ointment Apply to AA BID x 1-2 weeks then  g 0     RITO-MIN TABS   OR 1 TABLET DAILY 30 5     OMEPRAZOLE 20 MG PO CPDR take 30'-60' before 1st meal daily 93 Cap 3     ORDER FOR DME Jobst stockings (Patient not taking: Reported on 3/29/2019) 4 5       Luis Leblanc MD  [facility]          Active Problem List:     Patient Active Problem List    Diagnosis Date Noted     Advanced directives, counseling/discussion 05/19/2011     Priority: High     Advance Care Planning 4/6/2018: ACP Review of Chart / Resources Provided:  Reviewed chart for advance care plan.  Ej Garciadarylestuardo has an up to date advance care plan on file.  Added by Grace Henao              Advance Directive Problem List Overview:   Name Relationship Phone    Primary Health Care Agent Elizabeth George spouse [phone]         Alternative Health Care Agent Easton George son Not available       Advance Directive received and scanned. Click on Code in the patient header to view.   Reviewed health care directive with patient   Current information re: alternative agent phone # is not accurate   Patient will update when he has current information 7/18/2011     Pt will bring  in copy of health care directive at next appt       Morbid obesity (H) 08/17/2017     Priority: Medium     Pain in joint, lower leg 05/29/2014     Priority: Medium     Obesity 05/21/2014     Priority: Medium     Osteoarthrosis of knee 05/21/2014     Priority: Medium     Idiopathic cardiomyopathy (H) 02/10/2014     Priority: Medium     Hyperlipidemia with target LDL less than 130      Priority: Medium     Diagnosis updated by automated process. Provider to review and confirm.       ESOPHAGEAL REFLUX 12/26/2006     Priority: Medium     Ulcerative (chronic) proctitis (H) 01/17/2006     Priority: Medium     Essential hypertension, benign 09/10/2005     Priority: Medium            History of Present Illness:     Chief Complaint   Patient presents with     RECHECK       March 26, 2019  Have you ever seen a rheumatologist No Who NA When NA  Joint pain history  Onset: Patient is here to establish care for both wrist and hand pain and some swelling  Involved joints: both hands, wrists for the past 2 months  Pain scale:  8/10     Wakes the patient from sleep : Yes  Morning stiffness:Yes for 120 minutes  Meds used:prednisone, motrin     Interim history  Since last visit:  1. Infections - No  2. New symptoms/medical problem - No  3. Any side effects from Rheum medications -None  3. ER visits/Hospitalizations/surgeries - No  4. Last PCP visit: 2/14/19    Wt Readings from Last 4 Encounters:   04/24/19 102.5 kg (226 lb)   03/29/19 103.4 kg (228 lb)   02/23/19 103.9 kg (229 lb)   02/14/19 104.2 kg (229 lb 12.8 oz)   with diet.     No h/o unintentional weight loss, fevers, rash, swollen glands  No h/o gout  No family or personal history of psoriasis, chron's disease. No h/o iritis or glaucoma.  Patient denies any raynauds  No h/o persistent shortness of breath, cough, chest pain  No h/o persistent vomiting, diarrhea, abdominal pain  No h/o hematochezia, hematuria, hemoptysis  No h/o seizures or strokes  No h/o cancer    April 24,  2019  Have you ever seen a rheumatologist yes Who you When 3/29/19  Joint pain history  Onset: Patient is here for a follow up. Patient states since LOV hands are moving as they should be. No swelling since LOV.   Involved joints: hands, knees  Pain scale:  2/10     Wakes the patient from sleep : No  Morning stiffness:Yes for 2 minutes  Meds used:prednisone, leflunomide     Interim history  Since last visit:  1. Infections - No  2. New symptoms/medical problem - No  3. Any side effects from Rheum medications -none  3. ER visits/Hospitalizations/surgeries - No  4. Last PCP visit: 19    BP Readings from Last 3 Encounters:   19 106/64   19 116/72   19 117/67              Review of Systems:   Complete ROS negative except for symptoms mentioned in the HPI          Past Medical History:     Past Medical History:   Diagnosis Date     CHF (congestive heart failure) (H)     EF 40-45%. now 55%     Eczema      Esophageal reflux      Essential hypertension, benign      Hyperlipidemia LDL goal < 130      Idiopathic cardiomyopathy (H)      Ulcerative (chronic) proctitis (H)      Varicose veins of leg      Past Surgical History:   Procedure Laterality Date     ENDOSCOPIC STRIPPING VEIN(S)       HC REPAIR ROTATOR CUFF,ACUTE  8/10/05    right            Social History:     Social History     Occupational History     Occupation: tool      Comment: [employer]   Tobacco Use     Smoking status: Former Smoker     Packs/day: 0.50     Years: 14.00     Pack years: 7.00     Types: Cigarettes     Start date: 1961     Last attempt to quit: 1975     Years since quittin.3     Smokeless tobacco: Never Used   Substance and Sexual Activity     Alcohol use: Yes     Alcohol/week: 0.6 oz     Types: 1 Standard drinks or equivalent per week     Comment: occasionally     Drug use: No     Sexual activity: Yes     Partners: Female            Family History:     Family History   Problem Relation Age of  "Onset     Heart Disease Mother 62        d:age 62 MI     Heart Disease Father 86        D:86 chf     Cancer Sister         d:age 58 liver     Family History Negative Sister         b:1934     Hypertension Sister         b:1942     Cerebrovascular Disease Brother              Diabetes Brother      Dementia Brother      Prostate Cancer Brother 65     Lung Cancer Brother      Cancer Son 35        brain cancer - surgery with seizures     C.A.D. Son 36        tobacco            Allergies:   No Known Allergies         Medications:     Current Outpatient Medications   Medication Sig Dispense Refill     carvedilol (COREG) 25 MG tablet TAKE 1 TABLET TWICE DAILY WITH MEALS 180 tablet 2     GLUCOSAMINE 500 MG OR CAPS 1 tablet twice daily 60 5     leflunomide (ARAVA) 20 MG tablet Take 1 tablet (20 mg) by mouth daily Labs every 8-12 weeks 90 tablet 0     lisinopril-hydrochlorothiazide (PRINZIDE/ZESTORETIC) 20-25 MG tablet Take 1 tablet by mouth daily 90 tablet 0     predniSONE (DELTASONE) 10 MG tablet Take 5 mg PO daily X 4 weeks and then stop. 60 tablet 0     simvastatin (ZOCOR) 40 MG tablet Take 1 tablet (40 mg) by mouth At Bedtime 90 tablet 0     spironolactone (ALDACTONE) 25 MG tablet Take 1 tablet (25 mg) by mouth daily 90 tablet 0     triamcinolone (KENALOG) 0.1 % external ointment Apply to AA BID x 1-2 weeks then  g 0     RITO-MIN TABS   OR 1 TABLET DAILY 30 5     OMEPRAZOLE 20 MG PO CPDR take 30'-60' before 1st meal daily 93 Cap 3     ORDER FOR DME Jobst stockings (Patient not taking: Reported on 3/29/2019) 4 5            Physical Exam:   Blood pressure 106/64, pulse 81, temperature 98.2  F (36.8  C), temperature source Oral, height 1.778 m (5' 10\"), weight 102.5 kg (226 lb), SpO2 94 %.  Wt Readings from Last 4 Encounters:   04/24/19 102.5 kg (226 lb)   03/29/19 103.4 kg (228 lb)   02/23/19 103.9 kg (229 lb)   02/14/19 104.2 kg (229 lb 12.8 oz)       Constitutional: well-developed, appearing stated age; " cooperative  MS: Bilateral synovitis in wrists- RESOLVED. Fist 80% bilateral--->95% right and 90% left. Synovitis noted in multiple PIPs bilateral - RESOLVED. Bony swellings noted in DIPs.   Skin: no rash in exposed areas  Psych: nl judgement, orientation, memory, affect.         Data:         Luis Leblanc MD    [facility]

## 2019-04-26 ENCOUNTER — OFFICE VISIT (OUTPATIENT)
Dept: INTERNAL MEDICINE | Facility: CLINIC | Age: 74
End: 2019-04-26
Payer: COMMERCIAL

## 2019-04-26 VITALS
HEART RATE: 69 BPM | DIASTOLIC BLOOD PRESSURE: 70 MMHG | WEIGHT: 225.6 LBS | OXYGEN SATURATION: 97 % | RESPIRATION RATE: 14 BRPM | SYSTOLIC BLOOD PRESSURE: 110 MMHG | TEMPERATURE: 97.7 F | HEIGHT: 70 IN | BODY MASS INDEX: 32.3 KG/M2

## 2019-04-26 DIAGNOSIS — Z00.00 MEDICARE ANNUAL WELLNESS VISIT, SUBSEQUENT: Primary | ICD-10-CM

## 2019-04-26 DIAGNOSIS — E78.5 HYPERLIPIDEMIA WITH TARGET LDL LESS THAN 130: ICD-10-CM

## 2019-04-26 DIAGNOSIS — E66.01 MORBID OBESITY (H): ICD-10-CM

## 2019-04-26 DIAGNOSIS — M06.049 SERONEGATIVE RHEUMATOID ARTHRITIS OF HAND, UNSPECIFIED LATERALITY (H): ICD-10-CM

## 2019-04-26 DIAGNOSIS — I10 ESSENTIAL HYPERTENSION, BENIGN: ICD-10-CM

## 2019-04-26 DIAGNOSIS — I42.9 IDIOPATHIC CARDIOMYOPATHY (H): ICD-10-CM

## 2019-04-26 PROCEDURE — 99397 PER PM REEVAL EST PAT 65+ YR: CPT | Performed by: INTERNAL MEDICINE

## 2019-04-26 RX ORDER — SPIRONOLACTONE 25 MG/1
25 TABLET ORAL DAILY
Qty: 90 TABLET | Refills: 3 | Status: SHIPPED | OUTPATIENT
Start: 2019-04-26 | End: 2020-05-18

## 2019-04-26 RX ORDER — SIMVASTATIN 40 MG
40 TABLET ORAL AT BEDTIME
Qty: 90 TABLET | Refills: 3 | Status: SHIPPED | OUTPATIENT
Start: 2019-04-26 | End: 2020-04-22

## 2019-04-26 RX ORDER — LISINOPRIL AND HYDROCHLOROTHIAZIDE 20; 25 MG/1; MG/1
1 TABLET ORAL DAILY
Qty: 90 TABLET | Refills: 3 | Status: SHIPPED | OUTPATIENT
Start: 2019-04-26 | End: 2020-05-18

## 2019-04-26 RX ORDER — CARVEDILOL 25 MG/1
TABLET ORAL
Qty: 180 TABLET | Refills: 3 | Status: SHIPPED | OUTPATIENT
Start: 2019-04-26 | End: 2020-05-28

## 2019-04-26 ASSESSMENT — MIFFLIN-ST. JEOR: SCORE: 1774.56

## 2019-04-26 ASSESSMENT — ACTIVITIES OF DAILY LIVING (ADL): CURRENT_FUNCTION: NO ASSISTANCE NEEDED

## 2019-04-26 NOTE — PROGRESS NOTES
"SUBJECTIVE:   Ej George is a 73 year old male who presents for Preventive Visit.    Are you in the first 12 months of your Medicare coverage?  No    Healthy Habits:     In general, how would you rate your overall health?  Good    Frequency of exercise:  None    Do you usually eat at least 4 servings of fruit and vegetables a day, include whole grains    & fiber and avoid regularly eating high fat or \"junk\" foods?  Yes    Taking medications regularly:  Yes    Barriers to taking medications:  None    Medication side effects:  None    Ability to successfully perform activities of daily living:  No assistance needed    Home Safety:  No safety concerns identified    Hearing Impairment:  Difficulty understanding soft or whispered speech    In the past 6 months, have you been bothered by leaking of urine?  No    In general, how would you rate your overall mental or emotional health?  Excellent      PHQ-2 Total Score: 0    Additional concerns today:  Yes    Do you feel safe in your environment? Yes    Do you have a Health Care Directive? Yes: Advance Directive has been received and scanned.    Fall risk  Fallen 2 or more times in the past year?: (P) No  Any fall with injury in the past year?: (P) No    Cognitive Screening   1) Repeat 3 items (Leader, Season, Table)    2) Clock draw: NORMAL  3) 3 item recall: Recalls 2 objects   Results: NORMAL clock, 1-2 items recalled: COGNITIVE IMPAIRMENT LESS LIKELY    Mini-CogTM Copyright JASMYNE Orr. Licensed by the author for use in [facility]; reprinted with permission (renetta@.[facility]). All rights reserved.        Reviewed and updated as needed this visit by clinical staff  Tobacco  Allergies  Meds  Med Hx  Surg Hx  Fam Hx  Soc Hx        Reviewed and updated as needed this visit by Provider        Social History     Tobacco Use     Smoking status: Former Smoker     Packs/day: 0.50     Years: 14.00     Pack years: 7.00     Types: Cigarettes     Start date: " "1961     Last attempt to quit: 1975     Years since quittin.3     Smokeless tobacco: Never Used   Substance Use Topics     Alcohol use: Yes     Alcohol/week: 0.6 oz     Types: 1 Standard drinks or equivalent per week     Comment: occasionally     If you drink alcohol do you typically have >3 drinks per day or >7 drinks per week? No    Alcohol Use 2019   Prescreen: >3 drinks/day or >7 drinks/week? No   Prescreen: >3 drinks/day or >7 drinks/week? -     Current providers sharing in care for this patient include:   Patient Care Team:  Abiel Negron MD as PCP - General  Abiel Negron MD as Assigned PCP    The following health maintenance items are reviewed in Epic and correct as of today:  Health Maintenance   Topic Date Due     HF ACTION PLAN Q3 YR  1945     ZOSTER IMMUNIZATION (1 of 2) 1995     DTAP/TDAP/TD IMMUNIZATION (2 - Td) 2018     MEDICARE ANNUAL WELLNESS VISIT  2019     BMP Q6 MOS  10/18/2019     ALT Q1 YR  2020     BMP Q1 YR  2020     LIPID MONITORING Q1 YEAR  2020     CBC Q1 YR  2020     FALL RISK ASSESSMENT  2020     COLONOSCOPY Q5 YR  2020     ADVANCE DIRECTIVE PLANNING Q5 YRS  2023     PHQ-2  Completed     INFLUENZA VACCINE  Completed     PNEUMOCOCCAL IMMUNIZATION 65+ LOW/MEDIUM RISK  Completed     AORTIC ANEURYSM SCREENING (SYSTEM ASSIGNED)  Completed     HEPATITIS C SCREENING  Completed     IPV IMMUNIZATION  Aged Out     MENINGITIS IMMUNIZATION  Aged Out        Pneumonia Vaccine:completed    Review of Systems  Constitutional, HEENT, cardiovascular, pulmonary, GI, , musculoskeletal, neuro, skin, endocrine and psych systems are negative, except as otherwise noted.    OBJECTIVE:   /70   Pulse 69   Temp 97.7  F (36.5  C) (Oral)   Resp 14   Ht 1.778 m (5' 10\")   Wt 102.3 kg (225 lb 9.6 oz)   SpO2 97%   BMI 32.37 kg/m   Estimated body mass index is 32.37 kg/m  as calculated from the following:    " "Height as of this encounter: 1.778 m (5' 10\").    Weight as of this encounter: 102.3 kg (225 lb 9.6 oz).  Physical Exam  GENERAL: alert, no distress and over weight  EYES: Eyes grossly normal to inspection, PERRL and conjunctivae and sclerae normal  HENT: ear canals and TM's normal, nose and mouth without ulcers or lesions  NECK: no adenopathy, no asymmetry, masses, or scars and thyroid normal to palpation  RESP: lungs clear to auscultation - no rales, rhonchi or wheezes  CV: regular rate and rhythm, normal S1 S2, no S3 or S4, no murmur, click or rub, no peripheral edema and peripheral pulses strong  ABDOMEN: soft, nontender, no hepatosplenomegaly, no masses and bowel sounds normal  MS: no gross musculoskeletal defects noted, no edema  SKIN: no suspicious lesions or rashes  NEURO: Normal strength and tone, mentation intact and speech normal  PSYCH: mentation appears normal, affect normal/bright  LYMPH: no cervical, supraclavicular, axillary, or inguinal adenopathy  Chair stand- 9  Results for orders placed or performed in visit on 04/18/19   CBC with platelets   Result Value Ref Range    WBC 7.0 4.0 - 11.0 10e9/L    RBC Count 5.11 4.4 - 5.9 10e12/L    Hemoglobin 14.3 13.3 - 17.7 g/dL    Hematocrit 43.8 40.0 - 53.0 %    MCV 86 78 - 100 fl    MCH 28.0 26.5 - 33.0 pg    MCHC 32.6 31.5 - 36.5 g/dL    RDW 14.5 10.0 - 15.0 %    Platelet Count 175 150 - 450 10e9/L   AST   Result Value Ref Range    AST 16 0 - 45 U/L   ALT   Result Value Ref Range    ALT 29 0 - 70 U/L   Lipid panel reflex to direct LDL Fasting   Result Value Ref Range    Cholesterol 166 <200 mg/dL    Triglycerides 115 <150 mg/dL    HDL Cholesterol 49 >39 mg/dL    LDL Cholesterol Calculated 94 <100 mg/dL    Non HDL Cholesterol 117 <130 mg/dL   Basic metabolic panel   Result Value Ref Range    Sodium 136 133 - 144 mmol/L    Potassium 4.3 3.4 - 5.3 mmol/L    Chloride 102 94 - 109 mmol/L    Carbon Dioxide 27 20 - 32 mmol/L    Anion Gap 7 3 - 14 mmol/L    Glucose " "105 (H) 70 - 99 mg/dL    Urea Nitrogen 23 7 - 30 mg/dL    Creatinine 1.01 0.66 - 1.25 mg/dL    GFR Estimate 73 >60 mL/min/[1.73_m2]    GFR Estimate If Black 85 >60 mL/min/[1.73_m2]    Calcium 8.7 8.5 - 10.1 mg/dL       ASSESSMENT / PLAN:   1. Medicare annual wellness visit, subsequent  - uptodate on screening  - immuniz uptodate other than Shingrix    2. Idiopathic cardiomyopathy (H)  Repeat echo- clinically looks resolved  - Echocardiogram Complete  - spironolactone (ALDACTONE) 25 MG tablet; Take 1 tablet (25 mg) by mouth daily  Dispense: 90 tablet; Refill: 3  - carvedilol (COREG) 25 MG tablet; TAKE 1 TABLET TWICE DAILY WITH MEALS  Dispense: 180 tablet; Refill: 3    3. Seronegative rheumatoid arthritis of hand, unspecified laterality (H)  Per rheum.  Significant improvement since being on leflunomide and prednisone    4. Hyperlipidemia with target LDL less than 130  - simvastatin (ZOCOR) 40 MG tablet; Take 1 tablet (40 mg) by mouth At Bedtime  Dispense: 90 tablet; Refill: 3    5. BENIGN HYPERTENSION  Well controlled  - Echocardiogram Complete  - lisinopril-hydrochlorothiazide (PRINZIDE/ZESTORETIC) 20-25 MG tablet; Take 1 tablet by mouth daily  Dispense: 90 tablet; Refill: 3  - spironolactone (ALDACTONE) 25 MG tablet; Take 1 tablet (25 mg) by mouth daily  Dispense: 90 tablet; Refill: 3  - carvedilol (COREG) 25 MG tablet; TAKE 1 TABLET TWICE DAILY WITH MEALS  Dispense: 180 tablet; Refill: 3    6. Over weight  Has achieved some weight loss in last year. continue    End of Life Planning:  Patient currently has an advanced directive: Yes.  Practitioner is supportive of decision.    COUNSELING:  Reviewed preventive health counseling, as reflected in patient instructions    BP Readings from Last 1 Encounters:   04/26/19 110/70     Estimated body mass index is 32.37 kg/m  as calculated from the following:    Height as of this encounter: 1.778 m (5' 10\").    Weight as of this encounter: 102.3 kg (225 lb 9.6 oz).      Weight " management plan: Discussed healthy diet and exercise guidelines     reports that he quit smoking about 44 years ago. His smoking use included cigarettes. He started smoking about 58 years ago. He has a 7.00 pack-year smoking history. He has never used smokeless tobacco.      Appropriate preventive services were discussed with this patient, including applicable screening as appropriate for cardiovascular disease, diabetes, osteopenia/osteoporosis, and glaucoma.  As appropriate for age/gender, discussed screening for colorectal cancer, prostate cancer, breast cancer, and cervical cancer. Checklist reviewing preventive services available has been given to the patient.    Reviewed patients plan of care and provided an AVS. The Basic Care Plan (routine screening as documented in Health Maintenance) for Ej meets the Care Plan requirement. This Care Plan has been established and reviewed with the Patient.    Counseling Resources:  ATP IV Guidelines  Pooled Cohorts Equation Calculator  Breast Cancer Risk Calculator  FRAX Risk Assessment  ICSI Preventive Guidelines  Dietary Guidelines for Americans, 2010  USDA's MyPlate  ASA Prophylaxis  Lung CA Screening    Abiel Negron MD  [facility]    Identified Health Risks:    He is at risk for lack of exercise and has been provided with information to increase physical activity for the benefit of his well-being.  The patient was provided with written information regarding signs of hearing loss.

## 2019-04-26 NOTE — PATIENT INSTRUCTIONS
Patient Education   Personalized Prevention Plan  You are due for the preventive services outlined below.  Your care team is available to assist you in scheduling these services.  If you have already completed any of these items, please share that information with your care team to update in your medical record.  Health Maintenance Due   Topic Date Due     Heart Failure Action Plan Reviewed Every 3 Years  1945     Zoster (Shingles) Vaccine (1 of 2) 09/17/1995     Diptheria Tetanus Pertussis (DTAP/TDAP/TD) Vaccine (2 - Td) 07/02/2018     Annual Wellness Visit  04/06/2019       Exercise for a Healthier Heart     Exercise with a friend. When activity is fun, you're more likely to stick with it.   You may wonder how you can improve the health of your heart. If you re thinking about exercise, you re on the right track. You don t need to become an athlete, but you do need a certain amount of brisk exercise to help strengthen your heart. If you have been diagnosed with a heart condition, your doctor may recommend exercise to help stabilize your condition. To help make exercise a habit, choose safe, fun activities.  Be sure to check with your healthcare provider before starting an exercise program.   Why exercise?  Exercising regularly offers many healthy rewards. It can help you do all of the following:    Improve your blood cholesterol level to help prevent further heart trouble    Lower your blood pressure to help prevent a stroke or heart attack    Control diabetes, or reduce your risk of getting this disease    Improve your heart and lung function    Reach and maintain a healthy weight    Make your muscles stronger and more limber so you can stay active    Prevent falls and fractures by slowing the loss of bone mass (osteoporosis)    Manage stress better    Reduce your blood pressure    Improve your sense of self and your body image  Exercise tips  Ease into your routine. Set small goals. Then build on them.   Exercise on most days. Aim for a total of 150 or more minutes of moderate to  vigorous intensity activity each week. Consider 40 minutes, 3 to 4 times a week. For best results, activity should last for 40 minutes on average. It is OK to work up to the 40 minute period over time. Examples of moderate-intensity activity is walking 1 mile in 15 minutes or 30 to 45 minutes of yard work.  Step up your daily activity level. Along with your exercise program, try being more active throughout the day. Walk instead of drive. Do more household tasks or yard work.  Choose one or more activities you enjoy. Walking is one of the easiest things you can do. You can also try swimming, riding a bike, dancing, or taking an exercise class.  Stop exercising and call your doctor if you:    Have chest pain or feel dizzy or lightheaded    Feel burning, tightness, pressure, or heaviness in your chest, neck, shoulders, back, or arms    Have unusual shortness of breath    Have increased joint or muscle pain    Have palpitations or an irregular heartbeat   Date Last Reviewed: 5/1/2016 2000-2018 [employer]. [address] [place] [address]. All rights reserved. This information is not intended as a substitute for professional medical care. Always follow your healthcare professional's instructions.          Signs of Hearing Loss     Hearing much better with one ear can be a sign of hearing loss.     Hearing loss is a problem shared by many people. In fact, it is one of the most common health conditions, particularly as people age. Most people over age 65 have some hearing loss, and by age 80, almost everyone does. Because hearing loss usually occurs slowly over the years, you may not realize your hearing ability has gotten worse.  Have your hearing checked  Contact your healthcare provider if you:    Have to strain to hear normal conversation    Have to watch other people s faces very carefully to follow what they re  saying    Need to ask people to repeat what they ve said    Often misunderstand what people are saying    Turn the volume of the television or radio up so high that others complain    Feel that people are mumbling when they re talking to you    Find that the effort to hear leaves you feeling tired and irritated    Notice, when using the phone, that you hear better with one ear than the other  Date Last Reviewed: 12/1/2016 2000-2018 [employer]. [address] [place] [address]. All rights reserved. This information is not intended as a substitute for professional medical care. Always follow your healthcare professional's instructions.

## 2019-05-21 NOTE — PROGRESS NOTES
[place] - Rheumatology Clinic Visit     Ej George MRN# [id number]   YOB: 1945    Primary care provider: Abiel Negron  May 30, 2019          Assessment and Plan:   # SERONEGATIVE RHEUMATOID ARTHRITIS (Dx 2019)  # Elevated CRP  # H/o ulcerative proctitis- about 2003; resolved per patient  # H/o ischemic cardiomyopathy    Sed rate within normal limits  Creat within normal limits  ALT within normal limits  CCP neg. BRIAN neg.   Uric acid < 4  HCV neg.     Uric acid gout test is normal.   Screening for Chronic Hepatitis B, and TB are negative.   Rheumatoid antibody normal.   Elevated white cell count can be monitored.     No suspicion for pseudogout at this time. But seronegative rheumatoid arthritis is the working diagnosis.     Disease activity: low (improved) on leflunomide 20mg PO daily and off of prednisone5/19. Very dramatic response to leflunomide.   Basic blood cell counts, liver and kidney function labs within normal limits    Started leflunomide as it is more convenient than methotrexate in terms of dosing for the patient.   CONTINUE LEFLUNOMIDE     Notified patient that my last day at clinic in [place] is mid-June. Gave options regarding other rheumatologists in the system or closer to him.    The labs from patient records are reviewed.     I will be back in touch with the patient through mychart/letter when results are available.     Patient agrees with the above mentioned treatment plan.     Most Recent Immunizations   Administered Date(s) Administered     Influenza (High Dose) 3 valent vaccine 11/27/2018     Influenza (IIV3) PF 11/20/2009     Pneumo Conj 13-V (2010&after) 02/25/2016     Pneumococcal 23 valent 05/12/2011     TD (ADULT, 7+) 07/02/2008       No orders of the defined types were placed in this encounter.      No follow-ups on file.    Medications Discontinued During This Encounter   Medication Reason     predniSONE (DELTASONE) 10 MG tablet Therapy completed      leflunomide (ARAVA) 20 MG tablet Reorder     Current Outpatient Medications   Medication Sig Dispense Refill     carvedilol (COREG) 25 MG tablet TAKE 1 TABLET TWICE DAILY WITH MEALS 180 tablet 3     GLUCOSAMINE 500 MG OR CAPS 1 tablet twice daily 60 5     leflunomide (ARAVA) 20 MG tablet Take 1 tablet (20 mg) by mouth daily Labs every 8-12 weeks 90 tablet 0     lisinopril-hydrochlorothiazide (PRINZIDE/ZESTORETIC) 20-25 MG tablet Take 1 tablet by mouth daily 90 tablet 3     OMEPRAZOLE 20 MG PO CPDR take 30'-60' before 1st meal daily 93 Cap 3     simvastatin (ZOCOR) 40 MG tablet Take 1 tablet (40 mg) by mouth At Bedtime 90 tablet 3     spironolactone (ALDACTONE) 25 MG tablet Take 1 tablet (25 mg) by mouth daily 90 tablet 3     triamcinolone (KENALOG) 0.1 % external ointment Apply to AA BID x 1-2 weeks then  g 0     RITO-MIN TABS   OR 1 TABLET DAILY 30 5     ORDER FOR DME Jobst stockings (Patient not taking: Reported on 5/30/2019) 4 5       Luis Leblanc MD  [facility]          Active Problem List:     Patient Active Problem List    Diagnosis Date Noted     Advanced directives, counseling/discussion 05/19/2011     Priority: High     Advance Care Planning 4/6/2018: ACP Review of Chart / Resources Provided:  Reviewed chart for advance care plan.  Ej CANTRELL Anaestuardo has an up to date advance care plan on file.  Added by Grace Henao              Advance Directive Problem List Overview:   Name Relationship Phone    Primary Health Care Agent Elizabeth George spouse [phone]         Alternative Health Care Agent Easton FROST Anaestuardo son Not available       Advance Directive received and scanned. Click on Code in the patient header to view.   Reviewed health care directive with patient   Current information re: alternative agent phone # is not accurate   Patient will update when he has current information 7/18/2011     Pt will bring in copy of health care directive at next appt        Morbid obesity (H) 08/17/2017     Priority: Medium     Pain in joint, lower leg 05/29/2014     Priority: Medium     Obesity 05/21/2014     Priority: Medium     Osteoarthrosis of knee 05/21/2014     Priority: Medium     Idiopathic cardiomyopathy (H) 02/10/2014     Priority: Medium     Hyperlipidemia with target LDL less than 130      Priority: Medium     Diagnosis updated by automated process. Provider to review and confirm.       ESOPHAGEAL REFLUX 12/26/2006     Priority: Medium     Ulcerative (chronic) proctitis (H) 01/17/2006     Priority: Medium     Essential hypertension, benign 09/10/2005     Priority: Medium            History of Present Illness:     Chief Complaint   Patient presents with     RECHECK       March 26, 2019  Have you ever seen a rheumatologist No Who NA When NA  Joint pain history  Onset: Patient is here to establish care for both wrist and hand pain and some swelling  Involved joints: both hands, wrists for the past 2 months  Pain scale:  8/10     Wakes the patient from sleep : Yes  Morning stiffness:Yes for 120 minutes  Meds used:prednisone, motrin     Interim history  Since last visit:  1. Infections - No  2. New symptoms/medical problem - No  3. Any side effects from Rheum medications -None  3. ER visits/Hospitalizations/surgeries - No  4. Last PCP visit: 2/14/19    Wt Readings from Last 4 Encounters:   05/30/19 100.2 kg (221 lb)   04/26/19 102.3 kg (225 lb 9.6 oz)   04/24/19 102.5 kg (226 lb)   03/29/19 103.4 kg (228 lb)   with diet.     No h/o unintentional weight loss, fevers, rash, swollen glands  No h/o gout  No family or personal history of psoriasis, chron's disease. No h/o iritis or glaucoma.  Patient denies any raynauds  No h/o persistent shortness of breath, cough, chest pain  No h/o persistent vomiting, diarrhea, abdominal pain  No h/o hematochezia, hematuria, hemoptysis  No h/o seizures or strokes  No h/o cancer    April 24, 2019  Have you ever seen a rheumatologist yes Who you  When 3/29/19  Joint pain history  Onset: Patient is here for a follow up. Patient states since LOV hands are moving as they should be. No swelling since LOV.   Involved joints: hands, knees  Pain scale:  2/10     Wakes the patient from sleep : No  Morning stiffness:Yes for 2 minutes  Meds used:prednisone, leflunomide     Interim history  Since last visit:  1. Infections - No  2. New symptoms/medical problem - No  3. Any side effects from Rheum medications -none  3. ER visits/Hospitalizations/surgeries - No  4. Last PCP visit: 2/14/19    May 30, 2019  Have you ever seen a rheumatologist yes Who you When 4/24/19  Joint pain history  Onset: Patient is here for a follow up. Stopped prednisone last week and reports has been doing well.  Involved joints: knees  Pain scale:  0.5/10     Wakes the patient from sleep : No  Morning stiffness:Yes for 1 minutes  Meds used:leflunomide     Interim history  Since last visit:  1. Infections - No  2. New symptoms/medical problem - No  3. Any side effects from Rheum medications -none  3. ER visits/Hospitalizations/surgeries - No  4. Last PCP visit: 4/26/19    BP Readings from Last 3 Encounters:   05/30/19 112/74   04/26/19 110/70   04/24/19 106/64              Review of Systems:   Complete ROS negative except for symptoms mentioned in the HPI          Past Medical History:     Past Medical History:   Diagnosis Date     CHF (congestive heart failure) (H)     EF 40-45%. now 55%     Eczema      Esophageal reflux      Essential hypertension, benign      Hyperlipidemia LDL goal < 130      Idiopathic cardiomyopathy (H)      Ulcerative (chronic) proctitis (H)      Varicose veins of leg      Past Surgical History:   Procedure Laterality Date     ENDOSCOPIC STRIPPING VEIN(S)       HC REPAIR ROTATOR CUFF,ACUTE  8/10/05    right            Social History:     Social History     Occupational History     Occupation: tool      Comment: [employer]   Tobacco Use     Smoking status:  Former Smoker     Packs/day: 0.50     Years: 14.00     Pack years: 7.00     Types: Cigarettes     Start date: 1961     Last attempt to quit: 1975     Years since quittin.4     Smokeless tobacco: Never Used   Substance and Sexual Activity     Alcohol use: Yes     Alcohol/week: 0.6 oz     Types: 1 Standard drinks or equivalent per week     Comment: occasionally     Drug use: No     Sexual activity: Yes     Partners: Female            Family History:     Family History   Problem Relation Age of Onset     Heart Disease Mother 62        d:age 62 MI     Heart Disease Father 86        D:86 chf     Cancer Sister         d:age 58 liver     Family History Negative Sister         b:193     Hypertension Sister         b:194     Cerebrovascular Disease Brother         11-     Diabetes Brother      Dementia Brother      Prostate Cancer Brother 65     Lung Cancer Brother      Cancer Son 35        brain cancer - surgery with seizures     C.A.DSteve Son 36        tobacco            Allergies:   No Known Allergies         Medications:     Current Outpatient Medications   Medication Sig Dispense Refill     carvedilol (COREG) 25 MG tablet TAKE 1 TABLET TWICE DAILY WITH MEALS 180 tablet 3     GLUCOSAMINE 500 MG OR CAPS 1 tablet twice daily 60 5     leflunomide (ARAVA) 20 MG tablet Take 1 tablet (20 mg) by mouth daily Labs every 8-12 weeks 90 tablet 0     lisinopril-hydrochlorothiazide (PRINZIDE/ZESTORETIC) 20-25 MG tablet Take 1 tablet by mouth daily 90 tablet 3     OMEPRAZOLE 20 MG PO CPDR take 30'-60' before 1st meal daily 93 Cap 3     simvastatin (ZOCOR) 40 MG tablet Take 1 tablet (40 mg) by mouth At Bedtime 90 tablet 3     spironolactone (ALDACTONE) 25 MG tablet Take 1 tablet (25 mg) by mouth daily 90 tablet 3     triamcinolone (KENALOG) 0.1 % external ointment Apply to AA BID x 1-2 weeks then  g 0     RITO-MIN TABS   OR 1 TABLET DAILY 30 5     ORDER FOR DME Jobst stockings (Patient not taking: Reported on  "5/30/2019) 4 5            Physical Exam:   Blood pressure 112/74, pulse 75, height 1.778 m (5' 10\"), weight 100.2 kg (221 lb), SpO2 97 %.  Wt Readings from Last 4 Encounters:   05/30/19 100.2 kg (221 lb)   04/26/19 102.3 kg (225 lb 9.6 oz)   04/24/19 102.5 kg (226 lb)   03/29/19 103.4 kg (228 lb)       Constitutional: well-developed, appearing stated age; cooperative  MS: Bilateral synovitis in wrists- RESOLVED. Fist 80% bilateral--->95% right and 90% left-->100% bilateral. Synovitis noted in multiple PIPs bilateral - RESOLVED. Bony swellings noted in DIPs.   Skin: no rash in exposed areas  Psych: nl judgement, orientation, memory, affect.         Data:         Luis Leblanc MD    [facility]    "

## 2019-05-23 DIAGNOSIS — Z79.899 ENCOUNTER FOR MONITORING LEFLUNOMIDE THERAPY: ICD-10-CM

## 2019-05-23 DIAGNOSIS — Z51.81 ENCOUNTER FOR MONITORING LEFLUNOMIDE THERAPY: ICD-10-CM

## 2019-05-23 LAB
ALT SERPL W P-5'-P-CCNC: 44 U/L (ref 0–70)
AST SERPL W P-5'-P-CCNC: 24 U/L (ref 0–45)
CREAT SERPL-MCNC: 1.08 MG/DL (ref 0.66–1.25)
ERYTHROCYTE [DISTWIDTH] IN BLOOD BY AUTOMATED COUNT: 15 % (ref 10–15)
GFR SERPL CREATININE-BSD FRML MDRD: 67 ML/MIN/{1.73_M2}
HCT VFR BLD AUTO: 43 % (ref 40–53)
HGB BLD-MCNC: 14.3 G/DL (ref 13.3–17.7)
MCH RBC QN AUTO: 28.7 PG (ref 26.5–33)
MCHC RBC AUTO-ENTMCNC: 33.3 G/DL (ref 31.5–36.5)
MCV RBC AUTO: 86 FL (ref 78–100)
PLATELET # BLD AUTO: 190 10E9/L (ref 150–450)
RBC # BLD AUTO: 4.98 10E12/L (ref 4.4–5.9)
WBC # BLD AUTO: 6.6 10E9/L (ref 4–11)

## 2019-05-23 PROCEDURE — 85027 COMPLETE CBC AUTOMATED: CPT | Performed by: INTERNAL MEDICINE

## 2019-05-23 PROCEDURE — 82565 ASSAY OF CREATININE: CPT | Performed by: INTERNAL MEDICINE

## 2019-05-23 PROCEDURE — 84450 TRANSFERASE (AST) (SGOT): CPT | Performed by: INTERNAL MEDICINE

## 2019-05-23 PROCEDURE — 84460 ALANINE AMINO (ALT) (SGPT): CPT | Performed by: INTERNAL MEDICINE

## 2019-05-23 PROCEDURE — 36415 COLL VENOUS BLD VENIPUNCTURE: CPT | Performed by: INTERNAL MEDICINE

## 2019-05-24 ENCOUNTER — HOSPITAL ENCOUNTER (OUTPATIENT)
Dept: CARDIOLOGY | Facility: CLINIC | Age: 74
Discharge: HOME | End: 2019-05-24
Attending: INTERNAL MEDICINE | Admitting: INTERNAL MEDICINE
Payer: COMMERCIAL

## 2019-05-24 DIAGNOSIS — I42.9 IDIOPATHIC CARDIOMYOPATHY (H): ICD-10-CM

## 2019-05-24 PROCEDURE — 93306 TTE W/DOPPLER COMPLETE: CPT | Mod: 26 | Performed by: INTERNAL MEDICINE

## 2019-05-24 PROCEDURE — 93306 TTE W/DOPPLER COMPLETE: CPT

## 2019-05-30 ENCOUNTER — OFFICE VISIT (OUTPATIENT)
Dept: RHEUMATOLOGY | Facility: CLINIC | Age: 74
End: 2019-05-30
Payer: COMMERCIAL

## 2019-05-30 VITALS
HEART RATE: 75 BPM | BODY MASS INDEX: 31.64 KG/M2 | SYSTOLIC BLOOD PRESSURE: 112 MMHG | DIASTOLIC BLOOD PRESSURE: 74 MMHG | HEIGHT: 70 IN | WEIGHT: 221 LBS | OXYGEN SATURATION: 97 %

## 2019-05-30 DIAGNOSIS — M19.90 INFLAMMATORY ARTHRITIS: ICD-10-CM

## 2019-05-30 DIAGNOSIS — Z79.899 ENCOUNTER FOR MONITORING LEFLUNOMIDE THERAPY: ICD-10-CM

## 2019-05-30 DIAGNOSIS — Z51.81 ENCOUNTER FOR MONITORING LEFLUNOMIDE THERAPY: ICD-10-CM

## 2019-05-30 PROCEDURE — 99213 OFFICE O/P EST LOW 20 MIN: CPT | Performed by: INTERNAL MEDICINE

## 2019-05-30 RX ORDER — LEFLUNOMIDE 20 MG/1
20 TABLET ORAL DAILY
Qty: 90 TABLET | Refills: 0 | Status: SHIPPED | OUTPATIENT
Start: 2019-05-30

## 2019-05-30 ASSESSMENT — MIFFLIN-ST. JEOR: SCORE: 1753.7

## 2019-05-30 ASSESSMENT — ROUTINE ASSESSMENT OF PATIENT INDEX DATA (RAPID3)
TOTAL RAPID3 SCORE: 2.3
RAPID3 INTERPRETATION: REMISSION

## 2019-05-30 NOTE — NURSING NOTE
"Chief Complaint   Patient presents with     RECHECK       Initial /74   Pulse 75   Ht 1.778 m (5' 10\")   Wt 100.2 kg (221 lb)   SpO2 97%   BMI 31.71 kg/m   Estimated body mass index is 31.71 kg/m  as calculated from the following:    Height as of this encounter: 1.778 m (5' 10\").    Weight as of this encounter: 100.2 kg (221 lb).  Medication Reconciliation: complete    Have you ever seen a rheumatologist yes Who you When 4/24/19  Joint pain history  Onset: Patient is here for a follow up. Stopped prednisone last week and reports has been doing well.  Involved joints: knees  Pain scale:  0.5/10     Wakes the patient from sleep : No  Morning stiffness:Yes for 1 minutes  Meds used:leflunomide    Interim history  Since last visit:  1. Infections - No  2. New symptoms/medical problem - No  3. Any side effects from Rheum medications -none  3. ER visits/Hospitalizations/surgeries - No  4. Last PCP visit: 4/26/19  Wt Readings from Last 4 Encounters:   05/30/19 100.2 kg (221 lb)   04/26/19 102.3 kg (225 lb 9.6 oz)   04/24/19 102.5 kg (226 lb)   03/29/19 103.4 kg (228 lb)     BP Readings from Last 3 Encounters:   05/30/19 112/74   04/26/19 110/70   04/24/19 106/64       "

## 2019-07-08 ENCOUNTER — TELEPHONE (OUTPATIENT)
Dept: INTERNAL MEDICINE | Facility: CLINIC | Age: 74
End: 2019-07-08

## 2019-07-08 DIAGNOSIS — I83.893 VARICOSE VEINS OF BILATERAL LOWER EXTREMITIES WITH OTHER COMPLICATIONS: Primary | ICD-10-CM

## 2019-07-08 DIAGNOSIS — I83.891 BLEEDING FROM VARICOSE VEINS OF RIGHT LOWER EXTREMITY: ICD-10-CM

## 2019-07-08 NOTE — TELEPHONE ENCOUNTER
Ej saw Marsha David 7/24/18 after noticing bleeding from a varicose vein on his right leg. Marsha suggested he follow up with a vascular group if he continued to have problems. Patient states he is having on going issues with veins breaking open (about once a month) and he would like a referral to a Vascular Surgeon.

## 2019-07-09 ENCOUNTER — TELEPHONE (OUTPATIENT)
Dept: OTHER | Facility: CLINIC | Age: 74
End: 2019-07-09

## 2019-07-09 NOTE — TELEPHONE ENCOUNTER
"Referral received via [facility] \"Work Que\", per  guidelines referral forwarded to [facility].     Monik Clark, TAYLORN, RN    "

## 2019-07-10 NOTE — TELEPHONE ENCOUNTER
Rec'vd referral from PCP that pt needs to be seen at [facility] for varicose veins. I clld pt, LMOM

## 2019-07-12 NOTE — TELEPHONE ENCOUNTER
Brittney pt again to schedule appt with KARI Moran stating I will mail our form and he can schedule when convenient for him.

## 2019-07-19 ENCOUNTER — APPOINTMENT (OUTPATIENT)
Dept: VASCULAR SURGERY | Facility: CLINIC | Age: 74
End: 2019-07-19
Payer: COMMERCIAL

## 2019-07-19 ENCOUNTER — OFFICE VISIT (OUTPATIENT)
Dept: VASCULAR SURGERY | Facility: CLINIC | Age: 74
End: 2019-07-19
Attending: INTERNAL MEDICINE
Payer: COMMERCIAL

## 2019-07-19 DIAGNOSIS — Z53.9 ERRONEOUS ENCOUNTER--DISREGARD: Primary | ICD-10-CM

## 2019-07-19 DIAGNOSIS — I83.893 VARICOSE VEINS OF BILATERAL LOWER EXTREMITIES WITH OTHER COMPLICATIONS: ICD-10-CM

## 2019-07-19 DIAGNOSIS — I83.891 BLEEDING FROM VARICOSE VEINS OF RIGHT LOWER EXTREMITY: ICD-10-CM

## 2019-07-19 PROCEDURE — 99203 OFFICE O/P NEW LOW 30 MIN: CPT | Performed by: SURGERY

## 2019-07-19 PROCEDURE — 93971 EXTREMITY STUDY: CPT | Performed by: SURGERY

## 2019-07-19 NOTE — PROGRESS NOTES
[facility] Consultation Note    HPI:  Ej George is a 73 year old male who was seen today in consultation regarding recent bleeding from spider veins and painful varicose veins with swelling, C3 disease.  Patient says within the last month he said 2 episodes of severe bleeding from the right medial knee we got to large spider vein clusters overlying significantly large cluster of varicose veins in the medial thigh.  He says he was in the tub and he had significant bleeding the blood was squirting across the tub and he was able to stop the bleeding with direct pressure and elevation.  He had a second episode couple weeks later with similar symptoms and is able to stop this bleeding as well.  He has compression hose at home is had for a long time from previous stripping of the left lower leg that he uses intermittently.  He also complains of right lower extremity heaviness and swelling as well as pain surrounding the varicose veins in the medial thigh that he occasionally takes Tylenol for for relief.  Overall he is concerned about repeat bleeding episode the most and then pain second.  He has had no DVT but previous vein stripping on the left side in 2010 by Dr. Frias.      ROS: Otherwise negative.    PHH:    Past Medical History:   Diagnosis Date     CHF (congestive heart failure) (H)     EF 40-45%. now 55%     Eczema      Esophageal reflux      Essential hypertension, benign      Hyperlipidemia LDL goal < 130      Idiopathic cardiomyopathy (H)      Ulcerative (chronic) proctitis (H)      Varicose veins of leg         Past Surgical History:   Procedure Laterality Date     ENDOSCOPIC STRIPPING VEIN(S)       HC REPAIR ROTATOR CUFF,ACUTE  8/10/05    right       ALLERGIES:  Patient has no known allergies.    MEDS:    Current Outpatient Medications:      carvedilol (COREG) 25 MG tablet, TAKE 1 TABLET TWICE DAILY WITH MEALS, Disp: 180 tablet, Rfl: 3      GLUCOSAMINE 500 MG OR CAPS, 1 tablet twice daily, Disp: 60, Rfl: 5     leflunomide (ARAVA) 20 MG tablet, Take 1 tablet (20 mg) by mouth daily Labs every 8-12 weeks, Disp: 90 tablet, Rfl: 0     lisinopril-hydrochlorothiazide (PRINZIDE/ZESTORETIC) 20-25 MG tablet, Take 1 tablet by mouth daily, Disp: 90 tablet, Rfl: 3     OMEPRAZOLE 20 MG PO CPDR, take 30'-60' before 1st meal daily, Disp: 93 Cap, Rfl: 3     ORDER FOR DME, Jobst stockings (Patient not taking: Reported on 5/30/2019), Disp: 4, Rfl: 5     simvastatin (ZOCOR) 40 MG tablet, Take 1 tablet (40 mg) by mouth At Bedtime, Disp: 90 tablet, Rfl: 3     spironolactone (ALDACTONE) 25 MG tablet, Take 1 tablet (25 mg) by mouth daily, Disp: 90 tablet, Rfl: 3     triamcinolone (KENALOG) 0.1 % external ointment, Apply to AA BID x 1-2 weeks then PRN, Disp: 454 g, Rfl: 0     RITO-MIN TABS   OR, 1 TABLET DAILY, Disp: 30, Rfl: 5    SOCIAL HABITS:    History   Smoking Status     Former Smoker     Packs/day: 0.50     Years: 14.00     Types: Cigarettes     Start date: 1/1/1961     Quit date: 1/1/1975   Smokeless Tobacco     Never Used     Social History    Substance and Sexual Activity      Alcohol use: Yes        Alcohol/week: 0.6 oz        Types: 1 Standard drinks or equivalent per week        Comment: occasionally      History   Drug Use No       FAMILY HISTORY:    Family History   Problem Relation Age of Onset     Heart Disease Mother 62        d:age 62 MI     Heart Disease Father 86        D:86 chf     Cancer Sister         d:age 58 liver     Family History Negative Sister         b:1934     Hypertension Sister         b:1942     Cerebrovascular Disease Brother              Diabetes Brother      Dementia Brother      Prostate Cancer Brother 65     Lung Cancer Brother      Cancer Son 35        brain cancer - surgery with seizures     C.A.D. Son 36        tobacco       PHYSICAL EXAMINATION:  Constitutional: No acute distress alert no interspace time.  HEENT: JERICHO  mucous members moist.  Chest: Nonlabored breathing.  Skin: Right lower extremity is got significantly large 48 mm varicose vein starting in the medial thigh about mid thigh and coursing along the medial aspect of the knee and onto the anterior calf.  These become severely distended upon standing.  He is got very light skin pigmentation change at the ankle on the right side and swelling is significant at the ankle.  2 small spider vein clusters with very large spider vein at the skin surface with a scab overlying it from recent bleeding in this area is also tender.  There is no evidence of phlebitis.  Neurologic: Normal motor, sensory, gait.  Psych: Normal mood, judgment, and affect.  Post exam: Palpable 2+ dorsalis pedis pulse and posterior tibial pulses bilaterally.    Imaging:    No DVT.  Right common femoral vein incompetence.     Right GSV incompetent from the SFJ to the knee.  It becomes tortuous and then incompetent again in the calf.  There is a 6 mm incompetent varicose vein arising from the mid-knee coursing anterolateral onto the lower calf that is incompetent.  Several incompetent perforators at the ankle.     The SSV and AASV are competent.         ASSESSMENT: This is a 73-year-old male with 2 large hemorrhagic episodes from spider veins from the medial right thigh and painful varicose veins with edema, C3 disease.    PLAN:    I discussed the findings with the patient his wife present in clinic today it appears that he has pretty significant symptomatic varicose veins as well as recent hemorrhagic spider veins.  I discussed treatment options briefly of radiofrequency ablation of the feeding vein followed by sclerotherapy of these 2 spider vein areas to help prevent further bleeding. Today I was able to get him in for his venous incompetency study.   These bleeding episodes have been very scary for him and he like to no longer have to deal with them.  He has compression hose already and he is worn them  reasonably regularly for the last 10 years.  I have consented him for a right GSV radiofrequency ablation, multiple medically necessary stab phlebectomy, and medically necessary sclerotherapy of two large bleeding spider vein clusters.  Risks were discussed in detail with the patient with his wife present and they agreed to proceed.     Pawel Sánchez MD  Vascular Surgery

## 2019-08-12 ENCOUNTER — APPOINTMENT (OUTPATIENT)
Dept: VASCULAR SURGERY | Facility: CLINIC | Age: 74
End: 2019-08-12

## 2019-08-12 PROCEDURE — 99207 ZZC VEINSOLUTIONS NO CHARGE VISIT: CPT | Performed by: SURGERY

## 2019-08-22 ENCOUNTER — OFFICE VISIT (OUTPATIENT)
Dept: VASCULAR SURGERY | Facility: CLINIC | Age: 74
End: 2019-08-22
Payer: COMMERCIAL

## 2019-08-22 ENCOUNTER — APPOINTMENT (OUTPATIENT)
Dept: VASCULAR SURGERY | Facility: CLINIC | Age: 74
End: 2019-08-22
Payer: COMMERCIAL

## 2019-08-22 DIAGNOSIS — Z53.9 ERRONEOUS ENCOUNTER--DISREGARD: Primary | ICD-10-CM

## 2019-08-22 PROCEDURE — 36475 ENDOVENOUS RF 1ST VEIN: CPT | Mod: RT | Performed by: SURGERY

## 2019-08-22 PROCEDURE — 37765 STAB PHLEB VEINS XTR 10-20: CPT | Mod: RT | Performed by: SURGERY

## 2019-08-22 PROCEDURE — 99207 ZZC VEINSOLUTIONS NO CHARGE VISIT: CPT | Performed by: SURGERY

## 2019-08-23 NOTE — PROGRESS NOTES
Pre procedure diagnosis:  Bleeding spider veins, swelling and painful varicose veins.    Post procedure diagnosis:  Bleeding spider veins, swelling and painful varicose veins.    Procedure:     Right greater saphenous vein radiofrequency ablation  Multiple medically necessary stab phlebectomy    Surgeon:  Dr. Pawel Sánchez MD    Assistant:  DIVINA Jennings    Oral analgesia:  0.1mg clonidine and 1 mg ativan taken orally.    Local Anesthesia:  1% lidocaine 3.5 mL and tumescent anesthesia.      Treatment length:  58.5 cm of the right GSV  RF cycles: 15 cycles  Treatment time:  5 minutes  Junction measurement:  2.83 cm  Tumescent volume: 562 mL    EBL:  < 5 mL    Indication:  Ej George is a 73 year old male who was seen in clinic regarding recent bleeding from spider veins and painful varicose veins with swelling, C3 disease.  Patient says within the last month he said 2 episodes of severe bleeding from the right medial knee we got to large spider vein clusters overlying significantly large cluster of varicose veins in the medial thigh.  He says he was in the tub and he had significant bleeding the blood was squirting across the tub and he was able to stop the bleeding with direct pressure and elevation.  He had a second episode couple weeks later with similar symptoms and is able to stop this bleeding as well.  He has compression hose at home is had for a long time from previous stripping of the left lower leg that he uses intermittently.  He also complains of right lower extremity heaviness and swelling as well as pain surrounding the varicose veins in the medial thigh that he occasionally takes Tylenol for for relief.  Overall he is concerned about repeat bleeding episode the most and then pain second.  He has given consent to proceed understanding risks associated with treatment.    Procedure:     The  patient was placed on the operating table in the supine position with the bed in reverse Trendelenburg.  The right leg was prepped and draped in the usual sterile fashion.  A time out was performed and the surgical team was in agreement with the planned procedure.  Using an ultrasound we confirmed the GSV was patent through the saphenofemoral junction. Using a micro-access needle we obtained ultrasound guided access of the GSV in the mid-calf.  The skin was infiltrated with 1% lidocaine and an 11 blade scalpel was used to make a skin incision.  The 7 Moroccan tapered sheath was then advanced into the GSV over the micro wire in the Seldinger technique.  We placed the closure catheter within 2.83 cm of the saphenofemoral junction and this was confirmed with ultrasound and measured.   The inferior epigastric vein was easily visualized as well and we were distal to this point.  The patient was placed in Trendelenburg position and using tumescent anesthesia we numbed the entire length of the GSV creating a nice wheel both anterior and posterior to the vein.  The saphenofemoral junction was visualized one more time and the catheter was confirmed to be 2.83 cm from the saphenofemoral junction.      The saphenofemoral junction was then infiltrated with tumescent.  Using ultrasound compression we then sequentially ablated the GSV for a total length of 58.5 cm.   The catheter and sheath were then removed from the vein without issue and pressure was held at the access site.  The saphenofemoral junction was visualized and the common femoral vein was patent and easily compressible with no thrombus.  The GSV appeared closed distal to the inferior epigastric vein with no clot protruding into the deep venous system.      The right leg varicose veins were marked prior to procedure and the surrounding tissue was infiltrated with tumescent analgesia.  Small stab incision with an ophthalmic scalpel blade were made and the veins were  avulsed with Salvador hooks.  A total of 17 stab phlebectomy were performed on the right leg.  I did not perform sclerotherapy today because the bleeding spider veins were overlying the areas of phlebectomy and the skin was thin.  I was concerned about causing a local ulceration and I can perform sclerotherapy later if needed in clinic.     The right leg was then cleaned with saline and the incisions were covered with bacitracin ointment.  The leg was then dressed with ABD pads, cast padding, and with ACE wrap from toes to groin.  The patient was monitored in bed for 30 minutes prior to leaving and then taken to the car in a wheelchair.    Pawel Sánchez MD, MD  Vascular Surgery

## 2019-08-26 ENCOUNTER — APPOINTMENT (OUTPATIENT)
Dept: VASCULAR SURGERY | Facility: CLINIC | Age: 74
End: 2019-08-26
Payer: COMMERCIAL

## 2019-08-26 PROCEDURE — 93971 EXTREMITY STUDY: CPT | Performed by: SURGERY

## 2019-08-26 PROCEDURE — 99207 ZZC VEINSOLUTIONS NO CHARGE VISIT: CPT | Performed by: SURGERY

## 2019-10-03 ENCOUNTER — HEALTH MAINTENANCE LETTER (OUTPATIENT)
Age: 74
End: 2019-10-03

## 2019-10-11 ENCOUNTER — APPOINTMENT (OUTPATIENT)
Dept: VASCULAR SURGERY | Facility: CLINIC | Age: 74
End: 2019-10-11
Payer: COMMERCIAL

## 2019-10-11 PROCEDURE — 99207 ZZC VEINSOLUTIONS POST OPERATIVE VISIT: CPT | Performed by: SURGERY

## 2020-01-02 ENCOUNTER — ANCILLARY PROCEDURE (OUTPATIENT)
Dept: GENERAL RADIOLOGY | Facility: CLINIC | Age: 75
End: 2020-01-02
Attending: PHYSICIAN ASSISTANT
Payer: COMMERCIAL

## 2020-01-02 ENCOUNTER — OFFICE VISIT (OUTPATIENT)
Dept: INTERNAL MEDICINE | Facility: CLINIC | Age: 75
End: 2020-01-02
Payer: COMMERCIAL

## 2020-01-02 VITALS
RESPIRATION RATE: 16 BRPM | HEART RATE: 58 BPM | OXYGEN SATURATION: 95 % | HEIGHT: 70 IN | SYSTOLIC BLOOD PRESSURE: 138 MMHG | BODY MASS INDEX: 32.78 KG/M2 | WEIGHT: 229 LBS | DIASTOLIC BLOOD PRESSURE: 88 MMHG | TEMPERATURE: 97.5 F

## 2020-01-02 DIAGNOSIS — M25.551 HIP PAIN, RIGHT: Primary | ICD-10-CM

## 2020-01-02 DIAGNOSIS — M25.551 HIP PAIN, RIGHT: ICD-10-CM

## 2020-01-02 DIAGNOSIS — M70.61 TROCHANTERIC BURSITIS OF RIGHT HIP: ICD-10-CM

## 2020-01-02 PROCEDURE — 99213 OFFICE O/P EST LOW 20 MIN: CPT | Performed by: PHYSICIAN ASSISTANT

## 2020-01-02 PROCEDURE — 73502 X-RAY EXAM HIP UNI 2-3 VIEWS: CPT

## 2020-01-02 RX ORDER — METHYLPREDNISOLONE 4 MG
TABLET, DOSE PACK ORAL
Qty: 21 TABLET | Refills: 0 | Status: SHIPPED | OUTPATIENT
Start: 2020-01-02 | End: 2020-05-28

## 2020-01-02 ASSESSMENT — MIFFLIN-ST. JEOR: SCORE: 1784.99

## 2020-01-02 NOTE — PATIENT INSTRUCTIONS
Patient Education     Back Exercises: Hip Rotator Stretch    To start, lie on your back with your knees bent and feet flat on the floor. Don t press your neck or lower back to the floor. Breathe deeply. You should feel comfortable and relaxed in this position.    Rest your right ankle on your left knee.    Place a towel behind your left thigh, and use it to pull the knee toward your chest. Feel the stretch in your buttocks.    Hold for 30 to 60 seconds. Release.    Repeat 2 times.    Switch legs.     If there is any pain other than stretch in the knee or buttock, stop and contact your healthcare provider.  For your safety, check with your healthcare provider before starting an exercise program.  Date Last Reviewed: 3/1/2018    [id number] [employer]. [address] [place] [address], [place] [id number]. All rights reserved. This information is not intended as a substitute for professional medical care. Always follow your healthcare professional's instructions.           Patient Education     Leg and Knee Exercises: Hip Pulls    This exercise helps build strong, balanced leg muscles. Make sure to adjust exercise bands as advised by your physical therapist. To do the exercise:    Stand with 1 leg about 1 foot away from a wall. The ankle of your other leg should be attached to a pulley or rubber tubing. Put that leg1 step behind.    Pull your attached foot forward. Keep your knees straight but not locked. (Point your toe straight forward unless told otherwise by your therapist.)    Return your leg slowly and steadily to the starting position. Do this as many times as advised by your physical therapist.    Repeat the steps with your other leg.  Note: To prevent injury, always warm up and stretch before your strength exercises. Stop any exercise that causes pain. Discuss it with your physical therapist or healthcare provider.  Date Last Reviewed: 3/1/2018    [id number] [employer]. [address] [place] [address]  Road, [place] [address]. All rights reserved. This information is not intended as a substitute for professional medical care. Always follow your healthcare professional's instructions.

## 2020-01-02 NOTE — PROGRESS NOTES
"Subjective     Ej George is a 74 year old male who presents to clinic today for the following health issues:    HPI   Joint Pain    Onset: 5-6 days    Description:   Location: left hip and left thigh  Character: Sharp and Cramping    Intensity: moderate    Progression of Symptoms: worse    Accompanying Signs & Symptoms:  Other symptoms: none    History:   Previous similar pain: no       Precipitating factors:   Trauma or overuse: no   No falls or known injury    Alleviating factors:  Improved by: Ibuprofen    Therapies Tried and outcome: ice, heat- didn't help  Walking a lot normally.       -------------------------------------    BP Readings from Last 3 Encounters:   01/02/20 138/88   05/30/19 112/74   04/26/19 110/70    Wt Readings from Last 3 Encounters:   01/02/20 103.9 kg (229 lb)   05/30/19 100.2 kg (221 lb)   04/26/19 102.3 kg (225 lb 9.6 oz)                    -------------------------------------  Reviewed and updated as needed this visit by Provider  Allergies  Meds         Review of Systems   ROS COMP: Constitutional, HEENT, cardiovascular, pulmonary, gi and gu systems are negative, except as otherwise noted.      Objective    /88 (BP Location: Left arm, Patient Position: Chair, Cuff Size: Adult Regular)   Pulse 58   Temp 97.5  F (36.4  C) (Oral)   Resp 16   Ht 1.778 m (5' 10\")   Wt 103.9 kg (229 lb)   SpO2 95%   BMI 32.86 kg/m    Body mass index is 32.86 kg/m .  Physical Exam   GENERAL: healthy, alert and no distress  RESP: lungs clear to auscultation - no rales, rhonchi or wheezes  CV: regular rate and rhythm, normal S1 S2, no S3 or S4, no murmur, click or rub, no peripheral edema and peripheral pulses strong  MS: mild tenderness lateral right hip  Pain with external rotation hip   SKIN: no suspicious lesions or rashes    Diagnostic Test Results:  Pending         Assessment & Plan     1. Hip pain, right    - methylPREDNISolone (MEDROL DOSEPAK) 4 MG tablet therapy pack; Follow " "Package Directions  Dispense: 21 tablet; Refill: 0  - XR Pelvis and Hip Right 1 View; Future    2. Trochanteric bursitis of right hip    - methylPREDNISolone (MEDROL DOSEPAK) 4 MG tablet therapy pack; Follow Package Directions  Dispense: 21 tablet; Refill: 0     BMI:   Estimated body mass index is 32.86 kg/m  as calculated from the following:    Height as of this encounter: 1.778 m (5' 10\").    Weight as of this encounter: 103.9 kg (229 lb).   Weight management plan: Patient was referred to their PCP to discuss a diet and exercise plan.        Icing heat and stretching  Medrol dose rikki as above  Will notify in my chart of xray report     See Patient Instructions    Return in about 2 weeks (around 1/16/2020) for recheck if not improving, regular primary provider.    Marsha Avendano PA-C  [facility]      "

## 2020-01-03 ENCOUNTER — TELEPHONE (OUTPATIENT)
Dept: INTERNAL MEDICINE | Facility: CLINIC | Age: 75
End: 2020-01-03

## 2020-01-03 DIAGNOSIS — Z12.5 ENCOUNTER FOR SCREENING FOR MALIGNANT NEOPLASM OF PROSTATE: ICD-10-CM

## 2020-01-03 DIAGNOSIS — M89.9 BONE LESION: Primary | ICD-10-CM

## 2020-01-03 LAB — RADIOLOGIST FLAGS: ABNORMAL

## 2020-01-03 NOTE — TELEPHONE ENCOUNTER
Please see imaging ordered by PA-C yesterday for hip /pelvis. Note : multiple focal sclerotic lesions in the pelvic bones. PA_C out so forwarding to PCP .Christelle Suazo RN

## 2020-01-06 NOTE — TELEPHONE ENCOUNTER
Needs bone scan and labs to eval this- differential incld benign incidental findings but also the possible of more serious causes such as cancer.    Labs ordered  Bone scan ordered

## 2020-01-06 NOTE — TELEPHONE ENCOUNTER
Pt called again about his xray results.  He got the results through [employer], but doesn't understand them.  Please call pt back.

## 2020-01-06 NOTE — TELEPHONE ENCOUNTER
Patient informed. Lab visit scheduled. Imaging scheduling number given.    Maegan VAZQUEZN, RN, PHN

## 2020-01-07 DIAGNOSIS — Z12.5 ENCOUNTER FOR SCREENING FOR MALIGNANT NEOPLASM OF PROSTATE: ICD-10-CM

## 2020-01-07 DIAGNOSIS — M89.9 BONE LESION: ICD-10-CM

## 2020-01-07 LAB — PSA SERPL-ACNC: 0.93 UG/L (ref 0–4)

## 2020-01-07 PROCEDURE — 00000402 ZZHCL STATISTIC TOTAL PROTEIN: Performed by: INTERNAL MEDICINE

## 2020-01-07 PROCEDURE — G0103 PSA SCREENING: HCPCS | Performed by: INTERNAL MEDICINE

## 2020-01-07 PROCEDURE — 36415 COLL VENOUS BLD VENIPUNCTURE: CPT | Performed by: INTERNAL MEDICINE

## 2020-01-07 PROCEDURE — 84165 PROTEIN E-PHORESIS SERUM: CPT | Performed by: INTERNAL MEDICINE

## 2020-01-09 LAB
ALBUMIN SERPL ELPH-MCNC: 4.4 G/DL (ref 3.7–5.1)
ALPHA1 GLOB SERPL ELPH-MCNC: 0.3 G/DL (ref 0.2–0.4)
ALPHA2 GLOB SERPL ELPH-MCNC: 0.8 G/DL (ref 0.5–0.9)
B-GLOBULIN SERPL ELPH-MCNC: 0.7 G/DL (ref 0.6–1)
GAMMA GLOB SERPL ELPH-MCNC: 1 G/DL (ref 0.7–1.6)
M PROTEIN SERPL ELPH-MCNC: 0 G/DL
PROT PATTERN SERPL ELPH-IMP: NORMAL

## 2020-01-10 ENCOUNTER — HOSPITAL ENCOUNTER (OUTPATIENT)
Dept: NUCLEAR MEDICINE | Facility: CLINIC | Age: 75
Setting detail: NUCLEAR MEDICINE
Discharge: HOME | End: 2020-01-10
Attending: INTERNAL MEDICINE | Admitting: INTERNAL MEDICINE
Payer: COMMERCIAL

## 2020-01-10 ENCOUNTER — HOSPITAL ENCOUNTER (OUTPATIENT)
Dept: NUCLEAR MEDICINE | Facility: CLINIC | Age: 75
Setting detail: NUCLEAR MEDICINE
End: 2020-01-10
Attending: INTERNAL MEDICINE
Payer: COMMERCIAL

## 2020-01-10 DIAGNOSIS — M89.9 BONE LESION: ICD-10-CM

## 2020-01-10 PROCEDURE — A9503 TC99M MEDRONATE: HCPCS | Performed by: INTERNAL MEDICINE

## 2020-01-10 PROCEDURE — 34300033 ZZH RX 343: Performed by: INTERNAL MEDICINE

## 2020-01-10 PROCEDURE — 78306 BONE IMAGING WHOLE BODY: CPT

## 2020-01-10 RX ORDER — TC 99M MEDRONATE 20 MG/10ML
25 INJECTION, POWDER, LYOPHILIZED, FOR SOLUTION INTRAVENOUS ONCE
Status: COMPLETED | OUTPATIENT
Start: 2020-01-10 | End: 2020-01-10

## 2020-01-10 RX ADMIN — TC 99M MEDRONATE 25 MCI.: 20 INJECTION, POWDER, LYOPHILIZED, FOR SOLUTION INTRAVENOUS at 10:10

## 2020-03-25 DIAGNOSIS — I83.891 VARICOSE VEINS OF LEG WITH EDEMA, RIGHT: Primary | ICD-10-CM

## 2020-04-14 ENCOUNTER — TELEPHONE (OUTPATIENT)
Dept: INTERNAL MEDICINE | Facility: CLINIC | Age: 75
End: 2020-04-14

## 2020-04-14 NOTE — TELEPHONE ENCOUNTER
Patient called for a lab appointment and phone appointment for med check and refills. He has the lab appointment tomorrow 4/15 and a phone appointment on 4/17. Please place lab orders as appropriate.

## 2020-04-21 DIAGNOSIS — E78.5 HYPERLIPIDEMIA WITH TARGET LDL LESS THAN 130: ICD-10-CM

## 2020-04-21 NOTE — TELEPHONE ENCOUNTER
Routing refill request to provider for review/approval because:  Labs not current:  LDL not on file in past 12 months

## 2020-04-22 RX ORDER — SIMVASTATIN 40 MG
TABLET ORAL
Qty: 90 TABLET | Refills: 1 | Status: SHIPPED | OUTPATIENT
Start: 2020-04-22 | End: 2020-08-21

## 2020-05-17 DIAGNOSIS — I10 ESSENTIAL HYPERTENSION, BENIGN: ICD-10-CM

## 2020-05-17 DIAGNOSIS — I42.9 IDIOPATHIC CARDIOMYOPATHY (H): ICD-10-CM

## 2020-05-18 RX ORDER — LISINOPRIL AND HYDROCHLOROTHIAZIDE 20; 25 MG/1; MG/1
TABLET ORAL
Qty: 90 TABLET | Refills: 0 | Status: SHIPPED | OUTPATIENT
Start: 2020-05-18 | End: 2020-08-18

## 2020-05-18 RX ORDER — SPIRONOLACTONE 25 MG/1
TABLET ORAL
Qty: 90 TABLET | Refills: 0 | Status: SHIPPED | OUTPATIENT
Start: 2020-05-18 | End: 2020-07-31

## 2020-05-18 NOTE — TELEPHONE ENCOUNTER
Routing refill request to provider for review/approval because:  Labs not current:  Normal serum potassium/sodium

## 2020-05-28 ENCOUNTER — VIRTUAL VISIT (OUTPATIENT)
Dept: INTERNAL MEDICINE | Facility: CLINIC | Age: 75
End: 2020-05-28
Payer: COMMERCIAL

## 2020-05-28 VITALS — DIASTOLIC BLOOD PRESSURE: 73 MMHG | HEART RATE: 69 BPM | SYSTOLIC BLOOD PRESSURE: 115 MMHG

## 2020-05-28 DIAGNOSIS — I10 ESSENTIAL HYPERTENSION, BENIGN: ICD-10-CM

## 2020-05-28 DIAGNOSIS — E78.5 HYPERLIPIDEMIA WITH TARGET LDL LESS THAN 130: Primary | ICD-10-CM

## 2020-05-28 DIAGNOSIS — I42.9 IDIOPATHIC CARDIOMYOPATHY (H): ICD-10-CM

## 2020-05-28 PROCEDURE — 99213 OFFICE O/P EST LOW 20 MIN: CPT | Mod: 95 | Performed by: INTERNAL MEDICINE

## 2020-05-28 RX ORDER — CARVEDILOL 25 MG/1
TABLET ORAL
Qty: 180 TABLET | Refills: 1 | Status: SHIPPED | OUTPATIENT
Start: 2020-05-28 | End: 2020-10-02

## 2020-05-28 NOTE — PROGRESS NOTES
"Ej George is a 74 year old male who is being evaluated via a billable telephone visit.      The patient has been notified of following:     \"This telephone visit will be conducted via a call between you and your physician/provider. We have found that certain health care needs can be provided without the need for a physical exam.  This service lets us provide the care you need with a short phone conversation.  If a prescription is necessary we can send it directly to your pharmacy.  If lab work is needed we can place an order for that and you can then stop by our lab to have the test done at a later time.    Telephone visits are billed at different rates depending on your insurance coverage. During this emergency period, for some insurers they may be billed the same as an in-person visit.  Please reach out to your insurance provider with any questions.    If during the course of the call the physician/provider feels a telephone visit is not appropriate, you will not be charged for this service.\"    Patient has given verbal consent for Telephone visit?  Yes    What phone number would you like to be contacted at? [phone]    How would you like to obtain your AVS? Sherlyt    Karen     Ej George is a 74 year old male who presents via phone visit today for the following health issues:    HPI  Hypertension Follow-up  BP Readings from Last 3 Encounters:   05/28/20 115/73   01/02/20 138/88   05/30/19 112/74        Do you check your blood pressure regularly outside of the clinic? Yes     Are you following a low salt diet? Yes    Are your blood pressures ever more than 140 on the top number (systolic) OR more   than 90 on the bottom number (diastolic), for example 140/90? No      How many servings of fruits and vegetables do you eat daily?  0-1    On average, how many sweetened beverages do you drink each day (Examples: soda, juice, sweet tea, etc.  Do NOT count diet or artificially sweetened " beverages)?   0    How many days per week do you exercise enough to make your heart beat faster? 7    How many minutes a day do you exercise enough to make your heart beat faster? Active at work    How many days per week do you miss taking your medication? 0                 Reviewed and updated as needed this visit by Provider         Review of Systems   Constitutional, HEENT, cardiovascular, pulmonary, gi and gu systems are negative, except as otherwise noted.       Objective   Reported vitals:  /73   Pulse 69    healthy, alert and no distress  PSYCH: Alert and oriented times 3; coherent speech, normal   rate and volume, able to articulate logical thoughts, able   to abstract reason, no tangential thoughts, no hallucinations   or delusions  His affect is normal  RESP: No cough, no audible wheezing, able to talk in full sentences  Remainder of exam unable to be completed due to telephone visits    Labs reviewed in Epic        Assessment/Plan:  1. Idiopathic cardiomyopathy (H)  2. BENIGN HYPERTENSION  Home # look good. Cont meds.  - carvedilol (COREG) 25 MG tablet; TAKE 1 TABLET TWICE DAILY WITH MEALS  Dispense: 180 tablet; Refill: 1  - **Basic metabolic panel FUTURE anytime; Future    3. Hyperlipidemia with target LDL less than 130  - Lipid panel reflex to direct LDL Fasting; Future    No follow-ups on file.      Phone call duration:  16 minutes    Abiel Negron MD

## 2020-08-05 DIAGNOSIS — I10 ESSENTIAL HYPERTENSION, BENIGN: ICD-10-CM

## 2020-08-05 DIAGNOSIS — E78.5 HYPERLIPIDEMIA WITH TARGET LDL LESS THAN 130: ICD-10-CM

## 2020-08-05 LAB
ANION GAP SERPL CALCULATED.3IONS-SCNC: 4 MMOL/L (ref 3–14)
BUN SERPL-MCNC: 16 MG/DL (ref 7–30)
CALCIUM SERPL-MCNC: 8.5 MG/DL (ref 8.5–10.1)
CHLORIDE SERPL-SCNC: 104 MMOL/L (ref 94–109)
CHOLEST SERPL-MCNC: 141 MG/DL
CO2 SERPL-SCNC: 29 MMOL/L (ref 20–32)
CREAT SERPL-MCNC: 0.97 MG/DL (ref 0.66–1.25)
GFR SERPL CREATININE-BSD FRML MDRD: 76 ML/MIN/{1.73_M2}
GLUCOSE SERPL-MCNC: 106 MG/DL (ref 70–99)
HDLC SERPL-MCNC: 35 MG/DL
LDLC SERPL CALC-MCNC: 84 MG/DL
NONHDLC SERPL-MCNC: 106 MG/DL
POTASSIUM SERPL-SCNC: 4.3 MMOL/L (ref 3.4–5.3)
SODIUM SERPL-SCNC: 137 MMOL/L (ref 133–144)
TRIGL SERPL-MCNC: 110 MG/DL

## 2020-08-05 PROCEDURE — 36415 COLL VENOUS BLD VENIPUNCTURE: CPT | Performed by: INTERNAL MEDICINE

## 2020-08-05 PROCEDURE — 80061 LIPID PANEL: CPT | Performed by: INTERNAL MEDICINE

## 2020-08-05 PROCEDURE — 80048 BASIC METABOLIC PNL TOTAL CA: CPT | Performed by: INTERNAL MEDICINE

## 2020-08-17 DIAGNOSIS — I10 ESSENTIAL HYPERTENSION, BENIGN: ICD-10-CM

## 2020-08-18 RX ORDER — LISINOPRIL AND HYDROCHLOROTHIAZIDE 20; 25 MG/1; MG/1
TABLET ORAL
Qty: 90 TABLET | Refills: 2 | Status: SHIPPED | OUTPATIENT
Start: 2020-08-18 | End: 2021-04-13

## 2020-08-21 DIAGNOSIS — E78.5 HYPERLIPIDEMIA WITH TARGET LDL LESS THAN 130: ICD-10-CM

## 2020-08-21 RX ORDER — SIMVASTATIN 40 MG
TABLET ORAL
Qty: 90 TABLET | Refills: 1 | Status: SHIPPED | OUTPATIENT
Start: 2020-08-21 | End: 2020-10-02

## 2020-10-02 ENCOUNTER — OFFICE VISIT (OUTPATIENT)
Dept: INTERNAL MEDICINE | Facility: CLINIC | Age: 75
End: 2020-10-02
Payer: COMMERCIAL

## 2020-10-02 VITALS
SYSTOLIC BLOOD PRESSURE: 122 MMHG | OXYGEN SATURATION: 96 % | BODY MASS INDEX: 33.46 KG/M2 | TEMPERATURE: 97.3 F | HEART RATE: 71 BPM | DIASTOLIC BLOOD PRESSURE: 60 MMHG | WEIGHT: 233.7 LBS | HEIGHT: 70 IN

## 2020-10-02 DIAGNOSIS — I42.9 IDIOPATHIC CARDIOMYOPATHY (H): ICD-10-CM

## 2020-10-02 DIAGNOSIS — I10 ESSENTIAL HYPERTENSION, BENIGN: ICD-10-CM

## 2020-10-02 DIAGNOSIS — M06.049 SERONEGATIVE RHEUMATOID ARTHRITIS OF HAND, UNSPECIFIED LATERALITY (H): ICD-10-CM

## 2020-10-02 DIAGNOSIS — E78.5 HYPERLIPIDEMIA WITH TARGET LDL LESS THAN 130: ICD-10-CM

## 2020-10-02 DIAGNOSIS — E66.01 MORBID OBESITY (H): ICD-10-CM

## 2020-10-02 DIAGNOSIS — Z00.00 ENCOUNTER FOR MEDICARE ANNUAL WELLNESS EXAM: Primary | ICD-10-CM

## 2020-10-02 PROCEDURE — G0008 ADMIN INFLUENZA VIRUS VAC: HCPCS | Performed by: INTERNAL MEDICINE

## 2020-10-02 PROCEDURE — 99213 OFFICE O/P EST LOW 20 MIN: CPT | Mod: 25 | Performed by: INTERNAL MEDICINE

## 2020-10-02 PROCEDURE — 99397 PER PM REEVAL EST PAT 65+ YR: CPT | Mod: 25 | Performed by: INTERNAL MEDICINE

## 2020-10-02 PROCEDURE — 90662 IIV NO PRSV INCREASED AG IM: CPT | Performed by: INTERNAL MEDICINE

## 2020-10-02 RX ORDER — CARVEDILOL 25 MG/1
TABLET ORAL
Qty: 180 TABLET | Refills: 3 | Status: SHIPPED | OUTPATIENT
Start: 2020-10-02

## 2020-10-02 RX ORDER — SPIRONOLACTONE 25 MG/1
TABLET ORAL
Qty: 90 TABLET | Refills: 3 | Status: SHIPPED | OUTPATIENT
Start: 2020-10-02

## 2020-10-02 RX ORDER — SIMVASTATIN 40 MG
40 TABLET ORAL AT BEDTIME
Qty: 90 TABLET | Refills: 3 | Status: SHIPPED | OUTPATIENT
Start: 2020-10-02

## 2020-10-02 ASSESSMENT — ACTIVITIES OF DAILY LIVING (ADL): CURRENT_FUNCTION: NO ASSISTANCE NEEDED

## 2020-10-02 ASSESSMENT — MIFFLIN-ST. JEOR: SCORE: 1801.31

## 2020-10-02 NOTE — PATIENT INSTRUCTIONS
Patient Education   Personalized Prevention Plan  You are due for the preventive services outlined below.  Your care team is available to assist you in scheduling these services.  If you have already completed any of these items, please share that information with your care team to update in your medical record.  Health Maintenance Due   Topic Date Due     Pneumococcal Vaccine (1 of 1 - PPSV23) 05/12/2016     Diptheria Tetanus Pertussis (DTAP/TDAP/TD) Vaccine (2 - Td) 07/02/2018     FALL RISK ASSESSMENT  04/26/2020     Colorectal Cancer Screening  04/27/2020     Flu Vaccine (1) 09/01/2020       Exercise for a Healthier Heart     Exercise with a friend. When activity is fun, you're more likely to stick with it.   You may wonder how you can improve the health of your heart. If you re thinking about exercise, you re on the right track. You don t need to become an athlete, but you do need a certain amount of brisk exercise to help strengthen your heart. If you have been diagnosed with a heart condition, your doctor may recommend exercise to help stabilize your condition. To help make exercise a habit, choose safe, fun activities.  Be sure to check with your healthcare provider before starting an exercise program.  Why exercise?  Exercising regularly offers many healthy rewards. It can help you do all of the following:    Improve your blood cholesterol level to help prevent further heart trouble    Lower your blood pressure to help prevent a stroke or heart attack    Control diabetes, or reduce your risk of getting this disease    Improve your heart and lung function    Reach and maintain a healthy weight    Make your muscles stronger and more limber so you can stay active    Prevent falls and fractures by slowing the loss of bone mass (osteoporosis)    Manage stress better    Reduce your blood pressure    Improve your sense of self and your body image  Exercise tips  Ease into your routine. Set small goals. Then build  on them.  Exercise on most days. Aim for a total of 150 or more minutes of moderate to  vigorous intensity activity each week. Consider 40 minutes, 3 to 4 times a week. For best results, activity should last for 40 minutes on average. It is OK to work up to the 40 minute period over time. Examples of moderate-intensity activity is walking 1 mile in 15 minutes or 30 to 45 minutes of yard work.  Step up your daily activity level. Along with your exercise program, try being more active throughout the day. Walk instead of drive. Do more household tasks or yard work.  Choose one or more activities you enjoy. Walking is one of the easiest things you can do. You can also try swimming, riding a bike, dancing, or taking an exercise class.  Stop exercising and call your doctor if you:    Have chest pain or feel dizzy or lightheaded    Feel burning, tightness, pressure, or heaviness in your chest, neck, shoulders, back, or arms    Have unusual shortness of breath    Have increased joint or muscle pain    Have palpitations or an irregular heartbeat  Date Last Reviewed: 5/1/2016 2000-2019 [employer]. [address] [place] [id number]. All rights reserved. This information is not intended as a substitute for professional medical care. Always follow your healthcare professional's instructions.

## 2020-10-02 NOTE — PROGRESS NOTES
"SUBJECTIVE:   Ej George is a 75 year old male who presents for Preventive Visit.    Patient has been advised of split billing requirements and indicates understanding: Yes   Are you in the first 12 months of your Medicare coverage?  No    Healthy Habits:     In general, how would you rate your overall health?  Very good    Frequency of exercise:  None    Do you usually eat at least 4 servings of fruit and vegetables a day, include whole grains    & fiber and avoid regularly eating high fat or \"junk\" foods?  Yes    Taking medications regularly:  Yes    Barriers to taking medications:  None    Medication side effects:  None    Ability to successfully perform activities of daily living:  No assistance needed    Home Safety:  No safety concerns identified    Hearing Impairment:  No hearing concerns    In the past 6 months, have you been bothered by leaking of urine?  No    In general, how would you rate your overall mental or emotional health?  Good      PHQ-2 Total Score: 0    Additional concerns today:  No    Do you feel safe in your environment? YES    Have you ever done Advance Care Planning? (For example, a Health Directive, POLST, or a discussion with a medical provider or your loved ones about your wishes): Yes, advance care planning is on file.      Fall risk  Fallen 2 or more times in the past year?: No  Any fall with injury in the past year?: No    Cognitive Screening   1) Repeat 3 items (Leader, Season, Table)    2) Clock draw: ABNORMAL   3) 3 item recall: Recalls 2 objects   Results: ABNORMAL clock, 1-2 items recalled: PROBABLE COGNITIVE IMPAIRMENT, **INFORM PROVIDER**    Mini-CogTM Copyright JASMYNE Orr. Licensed by the author for use in Health system; reprinted with permission (renetta@.[facility]). All rights reserved.      Do you have sleep apnea, excessive snoring or daytime drowsiness?: yes    Reviewed and updated as needed this visit by clinical staff  Tobacco  Allergies  Meds          "     Reviewed and updated as needed this visit by Provider                Social History     Tobacco Use     Smoking status: Former Smoker     Packs/day: 0.50     Years: 14.00     Pack years: 7.00     Types: Cigarettes     Start date: 1961     Quit date: 1975     Years since quittin.7     Smokeless tobacco: Never Used   Substance Use Topics     Alcohol use: Yes     Alcohol/week: 1.0 standard drinks     Types: 1 Standard drinks or equivalent per week     Comment: occasionally     If you drink alcohol do you typically have >3 drinks per day or >7 drinks per week? No    No flowsheet data found.         Hypertension Follow-up      Do you check your blood pressure regularly outside of the clinic? No     Are you following a low salt diet? Yes    Are your blood pressures ever more than 140 on the top number (systolic) OR more   than 90 on the bottom number (diastolic), for example 140/90? No    Current providers sharing in care for this patient include:   Patient Care Team:  Abiel Negron MD as PCP - General  Abiel Negron MD as Assigned PCP    The following health maintenance items are reviewed in Epic and correct as of today:  Health Maintenance   Topic Date Due     DTAP/TDAP/TD IMMUNIZATION (2 - Td) 2018     FALL RISK ASSESSMENT  2020     COLORECTAL CANCER SCREENING  2020     INFLUENZA VACCINE (1) 2020     BMP  2021     LIPID  2021     MEDICARE ANNUAL WELLNESS VISIT  10/02/2021     ADVANCE CARE PLANNING  10/02/2025     HEPATITIS C SCREENING  Completed     PHQ-2  Completed     ZOSTER IMMUNIZATION  Completed     AORTIC ANEURYSM SCREENING (SYSTEM ASSIGNED)  Completed     Pneumococcal Vaccine: Pediatrics (0 to 5 Years) and At-Risk Patients (6 to 64 Years)  Aged Out     IPV IMMUNIZATION  Aged Out     MENINGITIS IMMUNIZATION  Aged Out     HEPATITIS B IMMUNIZATION  Aged Out     Pneumococcal Vaccine: 65+ Years  Discontinued     Labs reviewed in EPIC      Review of  "Systems  Constitutional, HEENT, cardiovascular, pulmonary, GI, , musculoskeletal, neuro, skin, endocrine and psych systems are negative, except as otherwise noted.    OBJECTIVE:   /60   Pulse 71   Temp 97.3  F (36.3  C) (Temporal)   Ht 1.778 m (5' 10\")   Wt 106 kg (233 lb 11.2 oz)   SpO2 96%   BMI 33.53 kg/m   Estimated body mass index is 33.53 kg/m  as calculated from the following:    Height as of this encounter: 1.778 m (5' 10\").    Weight as of this encounter: 106 kg (233 lb 11.2 oz).  Physical Exam  GENERAL APPEARANCE: alert, no distress and over weight  EYES: Eyes grossly normal to inspection, PERRL and conjunctivae and sclerae normal  HENT: ear canals and TM's normal and nose and mouth without ulcers or lesions  NECK: no adenopathy, no asymmetry, masses, or scars and thyroid normal to palpation  RESP: lungs clear to auscultation - no rales, rhonchi or wheezes  CV: irregular rhythm- freq premature beats. Regular rate. trace ankle edema bilat  MS: extremities normal- no gross deformities noted  SKIN: no suspicious lesions or rashes  NEURO: Normal strength and tone, mentation intact and speech normal  PSYCH: mentation appears normal and affect normal/bright    Labs reviewed in Epic    ASSESSMENT / PLAN:   1. Encounter for Medicare annual wellness exam  Due for colon screening due to colon polyp f/u  HM otherwise uptodate    2. Essential hypertension, benign  Well controlled    3. Hyperlipidemia with target LDL less than 130  At goal- cont statin     4. Idiopathic cardiomyopathy (H)  2019 EF normal. Cont meds    5. Seronegative rheumatoid arthritis of hand, unspecified laterality (H)  Cont meds per rheum    6. Morbid obesity (H)  Weight loss      Patient has been advised of split billing requirements and indicates understanding: Yes  COUNSELING:  Reviewed preventive health counseling, as reflected in patient instructions       Regular exercise       Healthy diet/nutrition    Estimated body mass " "index is 33.53 kg/m  as calculated from the following:    Height as of this encounter: 1.778 m (5' 10\").    Weight as of this encounter: 106 kg (233 lb 11.2 oz).    Weight management plan: Discussed healthy diet and exercise guidelines    He reports that he quit smoking about 45 years ago. His smoking use included cigarettes. He started smoking about 59 years ago. He has a 7.00 pack-year smoking history. He has never used smokeless tobacco.      Appropriate preventive services were discussed with this patient, including applicable screening as appropriate for cardiovascular disease, diabetes, osteopenia/osteoporosis, and glaucoma.  As appropriate for age/gender, discussed screening for colorectal cancer, prostate cancer, breast cancer, and cervical cancer. Checklist reviewing preventive services available has been given to the patient.    Reviewed patients plan of care and provided an AVS. The Basic Care Plan (routine screening as documented in Health Maintenance) for Ej meets the Care Plan requirement. This Care Plan has been established and reviewed with the Patient.    Counseling Resources:  ATP IV Guidelines  Pooled Cohorts Equation Calculator  Breast Cancer Risk Calculator  Breast Cancer: Medication to Reduce Risk  FRAX Risk Assessment  ICSI Preventive Guidelines  Dietary Guidelines for Americans, 2010  USDA's MyPlate  ASA Prophylaxis  Lung CA Screening    Abiel Negron MD  [facility]    Identified Health Risks:        He is at risk for lack of exercise and has been provided with information to increase physical activity for the benefit of his well-being.  "

## 2020-10-02 NOTE — NURSING NOTE
"Chief Complaint   Patient presents with     Medicare Visit     /60   Pulse 71   Temp 97.3  F (36.3  C) (Temporal)   Ht 1.778 m (5' 10\")   Wt 106 kg (233 lb 11.2 oz)   SpO2 96%   BMI 33.53 kg/m   Estimated body mass index is 33.53 kg/m  as calculated from the following:    Height as of this encounter: 1.778 m (5' 10\").    Weight as of this encounter: 106 kg (233 lb 11.2 oz).        Pt aware due for colonoscopy, will schedule, was post poned due to ABRAHAN Plummer, KAYLIN  "

## 2021-04-12 DIAGNOSIS — I10 ESSENTIAL HYPERTENSION, BENIGN: ICD-10-CM

## 2021-04-13 RX ORDER — LISINOPRIL AND HYDROCHLOROTHIAZIDE 20; 25 MG/1; MG/1
TABLET ORAL
Qty: 90 TABLET | Refills: 2 | Status: SHIPPED | OUTPATIENT
Start: 2021-04-13

## 2021-09-05 ENCOUNTER — HEALTH MAINTENANCE LETTER (OUTPATIENT)
Age: 76
End: 2021-09-05

## 2021-12-26 ENCOUNTER — HEALTH MAINTENANCE LETTER (OUTPATIENT)
Age: 76
End: 2021-12-26

## 2022-10-23 ENCOUNTER — HEALTH MAINTENANCE LETTER (OUTPATIENT)
Age: 77
End: 2022-10-23

## 2023-04-02 ENCOUNTER — HEALTH MAINTENANCE LETTER (OUTPATIENT)
Age: 78
End: 2023-04-02

## 2025-01-24 NOTE — PATIENT INSTRUCTIONS
Preventive Health Recommendations:       Male Ages 65 and over    Yearly exam:             See your health care provider every year in order to  o   Review health changes.   o   Discuss preventive care.    o   Review your medicines if your doctor has prescribed any.    Talk with your health care provider about whether you should have a test to screen for prostate cancer (PSA).    Every 3 years, have a diabetes test (fasting glucose). If you are at risk for diabetes, you should have this test more often.    Every 5 years, have a cholesterol test. Have this test more often if you are at risk for high cholesterol or heart disease.     Every 10 years, have a colonoscopy. Or, have a yearly FIT test (stool test). These exams will check for colon cancer.    Talk to with your health care provider about screening for Abdominal Aortic Aneurysm if you have a family history of AAA or have a history of smoking.  Shots:     Get a flu shot each year.     Get a tetanus shot every 10 years.     Talk to your doctor about your pneumonia vaccines. There are now two you should receive - Pneumovax (PPSV 23) and Prevnar (PCV 13).    Talk to your doctor about a shingles vaccine.     Talk to your doctor about the hepatitis B vaccine.  Nutrition:     Eat at least 5 servings of fruits and vegetables each day.     Eat whole-grain bread, whole-wheat pasta and brown rice instead of white grains and rice.     Talk to your doctor about Calcium and Vitamin D.   Lifestyle    Exercise for at least 150 minutes a week (30 minutes a day, 5 days a week). This will help you control your weight and prevent disease.     Limit alcohol to one drink per day.     No smoking.     Wear sunscreen to prevent skin cancer.     See your dentist every six months for an exam and cleaning.     See your eye doctor every 1 to 2 years to screen for conditions such as glaucoma, macular degeneration and cataracts.   Form scanned to encounter.  PATIENT NOTIFIED FORM IS READY   PATIENT WILL PICK  UP COPY AT THE SEQ OFFICE